# Patient Record
Sex: MALE | Race: WHITE | NOT HISPANIC OR LATINO | Employment: OTHER | URBAN - METROPOLITAN AREA
[De-identification: names, ages, dates, MRNs, and addresses within clinical notes are randomized per-mention and may not be internally consistent; named-entity substitution may affect disease eponyms.]

---

## 2018-02-07 ENCOUNTER — CONVERSION ENCOUNTER (OUTPATIENT)
Dept: UROLOGY | Facility: CLINIC | Age: 68
End: 2018-02-07

## 2018-02-07 ENCOUNTER — OFFICE VISIT CONVERTED (OUTPATIENT)
Dept: UROLOGY | Facility: CLINIC | Age: 68
End: 2018-02-07
Attending: UROLOGY

## 2018-08-07 ENCOUNTER — CONVERSION ENCOUNTER (OUTPATIENT)
Dept: UROLOGY | Facility: CLINIC | Age: 68
End: 2018-08-07

## 2018-08-07 ENCOUNTER — OFFICE VISIT CONVERTED (OUTPATIENT)
Dept: UROLOGY | Facility: CLINIC | Age: 68
End: 2018-08-07
Attending: UROLOGY

## 2019-08-29 ENCOUNTER — OFFICE VISIT CONVERTED (OUTPATIENT)
Dept: UROLOGY | Facility: CLINIC | Age: 69
End: 2019-08-29
Attending: UROLOGY

## 2020-01-06 ENCOUNTER — OFFICE VISIT CONVERTED (OUTPATIENT)
Dept: SURGERY | Facility: CLINIC | Age: 70
End: 2020-01-06
Attending: SURGERY

## 2020-01-22 ENCOUNTER — HOSPITAL ENCOUNTER (OUTPATIENT)
Dept: PREADMISSION TESTING | Facility: HOSPITAL | Age: 70
Discharge: HOME | End: 2020-01-22
Attending: SURGERY

## 2020-01-29 ENCOUNTER — HOSPITAL ENCOUNTER (OUTPATIENT)
Dept: PERIOP | Facility: HOSPITAL | Age: 70
Setting detail: HOSPITAL OUTPATIENT SURGERY
Discharge: HOME | End: 2020-01-29
Attending: SURGERY

## 2020-02-11 ENCOUNTER — OFFICE VISIT CONVERTED (OUTPATIENT)
Dept: SURGERY | Facility: CLINIC | Age: 70
End: 2020-02-11
Attending: SURGERY

## 2020-02-25 ENCOUNTER — OFFICE VISIT CONVERTED (OUTPATIENT)
Dept: SURGERY | Facility: CLINIC | Age: 70
End: 2020-02-25
Attending: SURGERY

## 2020-08-27 ENCOUNTER — OFFICE VISIT CONVERTED (OUTPATIENT)
Dept: UROLOGY | Facility: CLINIC | Age: 70
End: 2020-08-27
Attending: UROLOGY

## 2020-08-27 ENCOUNTER — CONVERSION ENCOUNTER (OUTPATIENT)
Dept: UROLOGY | Facility: CLINIC | Age: 70
End: 2020-08-27

## 2021-02-08 ENCOUNTER — OFFICE VISIT CONVERTED (OUTPATIENT)
Dept: SURGERY | Facility: CLINIC | Age: 71
End: 2021-02-08
Attending: NURSE PRACTITIONER

## 2021-04-12 ENCOUNTER — HOSPITAL ENCOUNTER (OUTPATIENT)
Dept: GASTROENTEROLOGY | Facility: HOSPITAL | Age: 71
Setting detail: HOSPITAL OUTPATIENT SURGERY
Discharge: HOME | End: 2021-04-12
Attending: SURGERY

## 2021-04-26 ENCOUNTER — OFFICE VISIT CONVERTED (OUTPATIENT)
Dept: SURGERY | Facility: CLINIC | Age: 71
End: 2021-04-26
Attending: NURSE PRACTITIONER

## 2021-05-10 NOTE — H&P
History and Physical      Patient Name: Lawrence Olsen   Patient ID: [id number]   Sex: Male   YOB: 1950    Primary Care Provider: Devon Kaplan MD   Referring Provider: Devon Kaplan MD    Visit Date: February 8, 2021    Provider: WILLIAMS Hernandez   Location[place] [facility]   Location Address: [address] [place] [address]  [id number]   Location Phone: [phone]          Chief Complaint  · Requesting colonoscopy  · Age 50 or over  · FH of colon cancer  · Here today for a pre-surgical colon screening visit  · Personal History of Polyps      History Of Present Illness  The patient is a 70 year old /White male presenting to the Surgical Specialist office on a referral from Devon Kaplan MD.   Lawrence Olsen needs to have a screening colonoscopy.   Patient states that they have had a colonoscopy. 5 years ago   Patient currently complains of: no complaints   Patient Does have family history of colon cancer. Father      Patient presents today on referral from Dr. Devon Kaplan for a screening colonoscopy.  Patient denies any abdominal pain, diarrhea, or rectal bleeding.  Admits to family history of colon cancer with his father.  Reports last colonoscopy was 5 years ago. Admits to history of colonic polyps.           Past Medical History  Disease Name Date Onset Notes   Arthritis --  --    Bladder Disorder --  --    BPH (benign prostatic hypertrophy) with urinary obstruction 05/24/2016 --    Cancer --  --    Chest pain --  --    Elevated prostate specific antigen (PSA) 07/21/2017 --    HBP (high blood pressure) --  --    High blood pressure --  --    High cholesterol --  --    Limb Pain --  --    Limb Swelling --  --    Prostate Disorder --  --          Past Surgical History  Procedure Name Date Notes   Cholecstectomy --  --    Colonoscopy --  --    Gallbladder --  --    Hernia --  --          Medication List  Name Date Started Instructions   aspirin 81 mg oral tablet,delayed  "release (DR/EC)  take 1 tablet (81 mg) by oral route once daily   finasteride 5 mg oral tablet  take 1 tablet (5 mg) by oral route once daily   Flomax 0.4 mg oral capsule,extended release 24hr  take 1 capsule (0.4 mg) by oral route once daily 1/2 hour following the same meal each day   ibuprofen 600 mg oral tablet  take 1 tablet (600 mg) by oral route 3 times per day with food   Lotrel 10-20 mg oral capsule  take 1 capsule by oral route once daily         Allergy List  Allergen Name Date Reaction Notes   NO KNOWN DRUG ALLERGIES --  --  --        Allergies Reconciled  Family Medical History  Disease Name Relative/Age Notes   Cancer, Unspecified Father/   --    Heart Attack (MI) Father/   --    Family history of colon cancer Father/50s  Sister/50s   Father/50s; Sister/50s  Father/60s   -Father's Family History Unknown Father/   Father   Bladder calculus Mother/   Mother         Social History  Finding Status Start/Stop Quantity Notes   Alcohol Current some day --/-- occasional --     --  --/-- --  three 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 --  --/-- --  --    Tobacco Former 15/18 --  --          Review of Systems  · Constitutional  o Denies  o : fever, chills  · Eyes  o Denies  o : yellowish discoloration of eyes  · HENT  o Denies  o : difficulty swallowing  · Cardiovascular  o Denies  o : chest pain, chest pain on exertion  · Respiratory  o Denies  o : shortness of breath  · Gastrointestinal  o Denies  o : nausea, vomiting, diarrhea, constipation  · Genitourinary  o Denies  o : abnormal color of urine  · Integument  o Denies  o : rash  · Neurologic  o Denies  o : tingling or numbness  · Musculoskeletal  o Denies  o : joint pain  · Endocrine  o Denies  o : weight gain, weight loss      Vitals  Date Time BP Position Site L\R Cuff Size HR RR TEMP (F) WT  HT  BMI kg/m2 BSA m2 O2 Sat FR L/min FiO2 HC       02/08/2021 10:51 AM       14  199lbs 4oz 5'  5\" 33.16 2.04             Physical " Examination  · Constitutional  o Appearance  o : well developed, well-nourished, patient in no apparent distress  · Head and Face  o Head  o :   § Inspection  § : atraumatic, normocephalic  o Face  o :   § Inspection  § : no facial lesions  · Eyes  o Conjunctivae  o : conjunctivae normal  o Sclerae  o : sclerae white  · Neck  o Inspection/Palpation  o : normal appearance, no masses or tenderness, trachea midline  · Respiratory  o Respiratory Effort  o : breathing unlabored  · Skin and Subcutaneous Tissue  o General Inspection  o : no lesions present, no areas of discoloration, skin turgor normal, texture normal  · Neurologic  o Mental Status Examination  o :   § Orientation  § : grossly oriented to person, place and time  § Attention  § : attention normal, concentration abilities normal  § Fund of Knowledge  § : fund of knowledge within normal limits, patient aware of current events  o Gait and Station  o : normal gait, able to stand without difficulty  · Psychiatric  o Judgement and Insight  o : judgment and insight intact  o Mood and Affect  o : mood normal, affect appropriate              Assessment  · Family History of Colon Cancer     V16.0/Z80.0  · Personal history of colonic polyps     V12.72/Z86.010  · Screening for colon cancer     V76.51/Z12.11      Plan  · Orders  o Consent for Colonoscopy Screening-Possible risk/complications, benefits, and alternatives to surgical or invasive procedure have been explained to patient and/or legal guardian. -Patient has been evaluated and can tolerate anesthesia and/or sedation. Risks, benefits, and alternatives to anesthesia and sedation have been explained to patient and/or legal guardian. () - V12.72/Z86.010, V76.51/Z12.11, V16.0/Z80.0 - 04/12/2021  · Medications  o Medications have been Reconciled  o Transition of Care or Provider Policy  · Instructions  o Surgical Facility: [facility]  o Handouts Provided Pre-Procedure Instructions including date,  time, and location of procedure.   o PLAN: Proceeed with colonoscopy. Patient understands risks/benefits and is willing to proceed.   o ***Surgical Orders***  o RISK AND BENEFITS:  o Given these options, the patient has verbally expressed an understanding of the risks of the surgery and finds these risks acceptable. Will proceed with surgery as soon as possible.  o O.R. PREP: Per protocol   o IV: Per Anesthesia  o Please sign permit for: Colonoscopy with possible biopsies by Dr. Mckay.  o The above History and Physical Examination has been completed within 30 days of admission.  o ***Patient Status***  o Outpatient  o Follow up in the in the office post procedure.  o Electronically Identified Patient Education Materials Provided Electronically            Electronically Signed by: WILLIAMS Hernandez -Author on February 8, 2021 11:15:11 AM

## 2021-05-13 NOTE — PROGRESS NOTES
"   Progress Note      Patient Name: Lawrence Olsen   Patient ID: [id number]   Sex: Male   YOB: 1950    Primary Care Provider: Devon Kaplan MD   Referring Provider: Devon Kaplan MD    Visit Date: August 27, 2020    Provider: Raffy Samuels MD   Location: [facility]   Location Address: [address] [place] [address], [address] [place] [address]  [id number]   Location Phone: [phone]          Chief Complaint  · \"Here for a prostate check\"      History Of Present Illness  The patient is a 70 year old /White male , who presents to routine yearly follow up on bph and history of elevated psa. His most recent psa wnl. Patient underwent bilateral robotic inguinal hernia repair per Dr Mckay in January.          2/7/18 psa 1.76 9-4-19 1.85.  Patient had pain in the left lower quadrant about a week ago which has resolved.  He had some dysuria.  No gross hematuria.  He never had a kidney stone.  Patient was constipated but does not remember exactly how long ago was that.  Gets up 1 time to urinate.  No frequency in the daytime.  No gross hematuria.       Past Medical History  Arthritis; Bladder Disorder; BPH (benign prostatic hypertrophy) with urinary obstruction; Chest pain; Elevated Prostate Specific Antigen (PSA); HBP (high blood pressure); Limb Pain; Limb Swelling; Prostate Disorder         Past Surgical History  Cholecstectomy; Colonoscopy; Gallbladder; Hernia         Medication List  aspirin 81 mg oral tablet,delayed release (DR/EC); finasteride 5 mg oral tablet; Flomax 0.4 mg oral capsule,extended release 24hr; ibuprofen 600 mg oral tablet; Lotrel 10-20 mg oral capsule         Allergy List  NO KNOWN DRUG ALLERGIES         Family Medical History  Cancer, Unspecified; Heart Attack (MI); Family history of colon cancer; -Father's Family History Unknown; Bladder calculus         Social History  Alcohol (Current some day); ; Security; Tobacco (Former)         Review of " "Systems  · Constitutional  o Denies  o : fever, headache, chills  · Eyes  o Denies  o : eye pain, double vision, blurred vision  · HENT  o Denies  o : sinus problems, sore throat, ear infection  · Cardiovascular  o Denies  o : chest pain, high blood pressure, varicosities  · Respiratory  o Denies  o : shortness of breath, wheezing, frequent cough  · Gastrointestinal  o Denies  o : nausea, vomiting, heartburn, indigestion, abdominal pain  · Genitourinary  o Admits  o : painful urination  o Denies  o : urgency, frequency, urinary retention  · Integument  o Denies  o : rash, itching, boils  · Neurologic  o Denies  o : tingling or numbness, tremors, dizzy spells  · Musculoskeletal  o Admits  o : lower back pain on right side  o Denies  o : joint pain, neck pain,   · Endocrine  o Denies  o : cold intolerance, heat intolerance, tired, excessive thirst, sluggish  · Psychiatric  o Admits  o : feels satisfied with life  o Denies  o : severe depression, concerns with hurting themselves  · Heme-Lymph  o Denies  o : swollen glands, blood clotting problems  · Allergic-Immunologic  o Denies  o : sinus allergy symptoms, hay fever      Vitals  Date Time BP Position Site L\R Cuff Size HR RR TEMP (F) WT  HT  BMI kg/m2 BSA m2 O2 Sat        08/27/2020 02:07 /61 Sitting    57 - R   190lbs 0oz 5'  5\" 31.62 1.99           Physical Examination  · Constitutional  o Appearance  o : Well nourished, well developed patient in no acute distress. Ambulating without difficulty.  · Neck  o Thyroid  o : Normal size without tenderness, nodules or masses  · Respiratory  o Respiratory Effort  o : Breathing is unlabored without accessory muscle use  o Inspection of Chest  o : normal appearance, no retractions  o Auscultation of Lungs  o : Normal breath sounds  · Cardiovascular  o Heart  o :   § Auscultation of Heart  § : regular rate and rhythm, no murmurs, gallops or rubs  o Peripheral Vascular System  o : No " abnormalities  · Gastrointestinal  o Abdominal Examination  o : Scaphoid abdomen which is non-tender to palpation with normal tone and without rigidity or guarding. Normal bowel sounds. No masses present.  o Liver and spleen  o : No hepatomegaly present. Liver is non-tender to palpation and spleen is not palpable.  o Hernias  o : No abdominal wall hernias are present.  · Genitourinary  o Bladder  o : no abnormalities  o Penis  o : Normal appearance without lesion, discharge or masses.  o Urethral Meatus  o : no abnormalities  o Scrotum and Scrotal Contents  o :   § Scrotum  § : no abnormalities  § Epididymides  § : no abnormalities  § Testes  § : no abnormalities  o Digital Rectal Examination  o :   § Prostate  § : nontender to palpation, size normal, no nodules present, consistency normal  · Lymphatic  o Neck  o : No lymphadenopathy present  o Groin  o : No lymphadenopathy present  · Skin and Subcutaneous Tissue  o General Inspection  o : No rashes, lesions or areas of discoloration present. Skin turgor is normal.  o General Palpation  o : No abnormalities, masses or tenderness on palpation.  · Neurologic  o Mental Status Examination  o : grossly oriented to person, place and time  o Gait and Station  o : normal gait, able to stand without difficulty  · Psychiatric  o Mood and Affect  o : mood normal, affect appropriate      Figure 1.0: Pain Rating Scale-[place]         Results  · In-Office Procedures  o Lab procedure  § Automated dipstick urinalysis with microscopy (37308)   § Color Ur: Dark yellow   § Clarity Ur: Clear   § Glucose Ur Ql Strip: Negative   § Bilirub Ur Ql Strip: Negative   § Ketones Ur Ql Strip: Negative   § Sp Gr Ur Qn: 1.030   § Hgb Ur Ql Strip: Negative   § pH Ur-LsCnc: 5.5   § Prot Ur Ql Strip: Negative   § Urobilinogen Ur Strip-mCnc: 0.2 E.U./dL   § Nitrite Ur Ql Strip: Negative   § WBC Est Ur Ql Strip: Negative   § RBC UrnS Qn HPF: 0   § WBC UrnS Qn HPF: 0   § Bacteria UrnS Qn HPF: 0    § Crystals UrnS Qn HPF: 0   § Epithelial Cells (non renal): 0 /HPF  § Epithelial Cells (renal): 0       Assessment  · BPH with Urinary Obstruction       Benign prostatic hyperplasia with lower urinary tract symptoms     600.01/N40.1  Other obstructive and reflux uropathy     600.01/N13.8  · BPH (benign prostatic hyperplasia)     600.00/N40.0      Plan  · Medications  o Medications have been Reconciled  o Transition of Care or Provider Policy  · Instructions  o We will continue finasteride 5 mg daily and recheck him in 1 year's time            Electronically Signed by: Raffy Samuels MD -Author on August 27, 2020 02:37:09 PM

## 2021-05-14 VITALS — WEIGHT: 199.25 LBS | RESPIRATION RATE: 14 BRPM | BODY MASS INDEX: 33.2 KG/M2 | HEIGHT: 65 IN

## 2021-05-14 VITALS
HEART RATE: 57 BPM | SYSTOLIC BLOOD PRESSURE: 132 MMHG | HEIGHT: 65 IN | DIASTOLIC BLOOD PRESSURE: 61 MMHG | WEIGHT: 190 LBS | BODY MASS INDEX: 31.65 KG/M2

## 2021-05-14 VITALS — WEIGHT: 198 LBS | HEIGHT: 65 IN | BODY MASS INDEX: 32.99 KG/M2 | RESPIRATION RATE: 14 BRPM

## 2021-05-14 NOTE — PROGRESS NOTES
Progress Note      Patient Name: Lawrence Olsen   Patient ID: [id number]   Sex: Male   YOB: 1950    Primary Care Provider: Devon Kaplan MD   Referring Provider: Devon Kaplan MD    Visit Date: April 26, 2021    Provider: WILLIAMS Hernandez   [facility]   Location Address: [address] [place] [address]  [id number]   Location Phone: [phone]          Chief Complaint  · Follow Up Colonoscopy      History Of Present Illness  Lawrence Olsen is a 71 year old /White male who is here to follow up colonoscopy.      Patient presents today for follow-up visit after undergoing a colonoscopy on 4/12/2021 performed by Dr. José Miguel Mckay.  Patient was with diverticulosis and a tubular adenoma of the sigmoid colon per colonoscopy/pathology. Internal hemorrhoids was also noted during the colonoscopy. Patient denies any postoperative complications.       Past Medical History  Disease Name Date Onset Notes   Arthritis --  --    Bladder disorder --  --    BPH (benign prostatic hypertrophy) with urinary obstruction 05/24/2016 --    Cancer --  --    Chest pain --  --    Elevated prostate specific antigen (PSA) 07/21/2017 --    HBP (high blood pressure) --  --    High blood pressure --  --    High cholesterol --  --    Limb Pain --  --    Limb Swelling --  --    Prostate Disorder --  --          Past Surgical History  Procedure Name Date Notes   Cholecstectomy --  --    Colonoscopy --  --    Gallbladder --  --    Hernia --  --          Medication List  Name Date Started Instructions   aspirin 81 mg oral tablet,delayed release (/EC)  take 1 tablet (81 mg) by oral route once daily   finasteride 5 mg oral tablet  take 1 tablet (5 mg) by oral route once daily   Flomax 0.4 mg oral capsule,extended release 24hr  take 1 capsule (0.4 mg) by oral route once daily 1/2 hour following the same meal each day   ibuprofen 600 mg oral tablet  take 1 tablet (600 mg) by oral route 3 times  "per day with food   Lotrel 10-20 mg oral capsule  take 1 capsule by oral route once daily         Allergy List  Allergen Name Date Reaction Notes   NO KNOWN DRUG ALLERGIES --  --  --        Allergies Reconciled  Family Medical History  Disease Name Relative/Age Notes   Cancer, Unspecified Father/   --    Heart Attack (MI) Father/   --    Family history of colon cancer Father/50s  Sister/50s   Father/50s; Sister/50s  Father/60s   -Father's Family History Unknown Father/   Father   Bladder calculus Mother/   Mother         Social History  Finding Status Start/Stop Quantity Notes   Alcohol Current some day --/-- occasional --     --  --/-- --  three 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 --  --/-- --  --    Tobacco Former 15/18 --  --          Review of Systems  · Constitutional  o Denies  o : chills, fever  · Eyes  o Denies  o : yellowish discoloration of eyes  · HENT  o Denies  o : difficulty swallowing  · Cardiovascular  o Denies  o : chest pain on exertion  · Respiratory  o Denies  o : shortness of breath  · Gastrointestinal  o Denies  o : nausea, vomiting, diarrhea, constipation  · Genitourinary  o Denies  o : abnormal color of urine  · Integument  o Denies  o : rash  · Neurologic  o Denies  o : tingling or numbness  · Musculoskeletal  o Denies  o : joint pain  · Endocrine  o Denies  o : weight gain, weight loss      Vitals  Date Time BP Position Site L\R Cuff Size HR RR TEMP (F) WT  HT  BMI kg/m2 BSA m2 O2 Sat FR L/min FiO2 HC       04/26/2021 01:20 PM       14  198lbs 0oz 5'  5\" 32.95 2.03             Physical Examination  · Constitutional  o Appearance  o : well developed, well-nourished, patient in no apparent distress  · Head and Face  o Head  o :   § Inspection  § : atraumatic, normocephalic  o Face  o :   § Inspection  § : no facial lesions  · Eyes  o Conjunctivae  o : conjunctivae normal  o Sclerae  o : sclerae white  · Neck  o Inspection/Palpation  o : normal appearance, no masses or tenderness, trachea " midline  · Respiratory  o Respiratory Effort  o : breathing unlabored  · Skin and Subcutaneous Tissue  o General Inspection  o : no lesions present, no areas of discoloration, skin turgor normal, texture normal  · Neurologic  o Mental Status Examination  o :   § Orientation  § : grossly oriented to person, place and time  § Attention  § : attention normal, concentration abilities normal  § Fund of Knowledge  § : fund of knowledge within normal limits, patient aware of current events  o Gait and Station  o : normal gait, able to stand without difficulty  · Psychiatric  o Judgement and Insight  o : judgment and insight intact  o Mood and Affect  o : mood normal, affect appropriate              Assessment  · Diverticulosis     562.10/K57.90  · Internal hemorrhoids     455.0/K64.8  · Tubular adenoma     229.9/D36.9  · S/P colonoscopy with polypectomy     V45.89/Z98.890    Problems Reconciled  Plan  · Medications  o Medications have been Reconciled  o Transition of Care or Provider Policy  · Instructions  o Per the AGA guidelines of 2012 patient is to follow up for colonoscopy surveillance  o Rescreen: In 5 years follow-up in the interim as needed.  o Increase fiber to 35 g/day  o Electronically Identified Patient Education Materials Provided Electronically  · Disposition  o EMR dragon/transcription disclaimer: Much of this encounter note is an electronic transcription/translation of spoken language to printed text. Electronic translation of spoken language may permit erroneous, or at times nonsensical words or phrases to be inadvertently trasncribed; although I have reviewed the note for such errors, some may still exist.            Electronically Signed by: WILLIAMS Hernandez -Author on April 26, 2021 02:10:09 PM

## 2021-05-15 VITALS
TEMPERATURE: 97.9 F | BODY MASS INDEX: 30.82 KG/M2 | SYSTOLIC BLOOD PRESSURE: 119 MMHG | HEIGHT: 65 IN | HEART RATE: 67 BPM | WEIGHT: 185 LBS | DIASTOLIC BLOOD PRESSURE: 58 MMHG

## 2021-05-15 VITALS — RESPIRATION RATE: 16 BRPM | WEIGHT: 190 LBS | BODY MASS INDEX: 31.65 KG/M2 | HEIGHT: 65 IN

## 2021-05-15 VITALS — RESPIRATION RATE: 14 BRPM | HEIGHT: 65 IN | WEIGHT: 188 LBS | BODY MASS INDEX: 31.32 KG/M2

## 2021-05-15 VITALS — WEIGHT: 191 LBS | RESPIRATION RATE: 14 BRPM | HEIGHT: 65 IN | BODY MASS INDEX: 31.82 KG/M2

## 2021-05-16 VITALS
HEIGHT: 65 IN | WEIGHT: 208 LBS | HEART RATE: 61 BPM | SYSTOLIC BLOOD PRESSURE: 150 MMHG | DIASTOLIC BLOOD PRESSURE: 81 MMHG | TEMPERATURE: 98.6 F | BODY MASS INDEX: 34.66 KG/M2

## 2021-05-16 VITALS
HEIGHT: 65 IN | WEIGHT: 215 LBS | DIASTOLIC BLOOD PRESSURE: 71 MMHG | HEART RATE: 61 BPM | TEMPERATURE: 98.5 F | SYSTOLIC BLOOD PRESSURE: 125 MMHG | BODY MASS INDEX: 35.82 KG/M2

## 2021-09-07 ENCOUNTER — OFFICE VISIT (OUTPATIENT)
Dept: UROLOGY | Facility: CLINIC | Age: 71
End: 2021-09-07

## 2021-09-07 VITALS
DIASTOLIC BLOOD PRESSURE: 76 MMHG | TEMPERATURE: 97.6 F | WEIGHT: 197 LBS | HEART RATE: 72 BPM | HEIGHT: 65 IN | BODY MASS INDEX: 32.82 KG/M2 | SYSTOLIC BLOOD PRESSURE: 128 MMHG

## 2021-09-07 DIAGNOSIS — N40.2 PROSTATIC NODULE: Primary | ICD-10-CM

## 2021-09-07 DIAGNOSIS — N40.0 BENIGN PROSTATIC HYPERPLASIA WITHOUT LOWER URINARY TRACT SYMPTOMS: ICD-10-CM

## 2021-09-07 LAB
BILIRUB BLD-MCNC: NEGATIVE MG/DL
CLARITY, POC: CLEAR
COLOR UR: YELLOW
GLUCOSE UR STRIP-MCNC: NEGATIVE MG/DL
KETONES UR QL: NEGATIVE
LEUKOCYTE EST, POC: NEGATIVE
NITRITE UR-MCNC: NEGATIVE MG/ML
PH UR: 5.5 [PH] (ref 5–8)
PROT UR STRIP-MCNC: NEGATIVE MG/DL
RBC # UR STRIP: NEGATIVE /UL
SP GR UR: 1.03 (ref 1–1.03)
UROBILINOGEN UR QL: NORMAL

## 2021-09-07 PROCEDURE — 81003 URINALYSIS AUTO W/O SCOPE: CPT | Performed by: UROLOGY

## 2021-09-07 PROCEDURE — 99213 OFFICE O/P EST LOW 20 MIN: CPT | Performed by: UROLOGY

## 2021-09-07 RX ORDER — FINASTERIDE 5 MG/1
TABLET, FILM COATED ORAL
COMMUNITY

## 2021-09-07 RX ORDER — ASPIRIN 81 MG/1
TABLET ORAL
COMMUNITY

## 2021-09-07 RX ORDER — TAMSULOSIN HYDROCHLORIDE 0.4 MG/1
CAPSULE ORAL
COMMUNITY

## 2021-09-07 RX ORDER — AMLODIPINE BESYLATE AND BENAZEPRIL HYDROCHLORIDE 10; 20 MG/1; MG/1
CAPSULE ORAL
COMMUNITY

## 2021-09-07 RX ORDER — IBUPROFEN 800 MG/1
TABLET ORAL
COMMUNITY
Start: 2021-08-17

## 2021-09-07 NOTE — PROGRESS NOTES
"Chief Complaint  Follow-up (1 YEAR)    BPH    Patient on finasteride and Flomax    Subjective  No acute distress        Lawrence Olsen presents to [facility]  History of Present Illness    Patient has nocturia twice and weak stream about 40% of the time but completely empties his urinary bladder.  His PSA was done by his primary care physician and we do not have the result.  Patient is very happy with his neurological symptoms    Objective No acute distress  Vital Signs:   /76 (BP Location: Right arm, Patient Position: Sitting, Cuff Size: Adult)   Pulse 72   Temp 97.6 °F (36.4 °C)   Ht 165.1 cm (65\")   Wt 89.4 kg (197 lb)   BMI 32.78 kg/m²     No Known Allergies   Review of Systems   Constitutional: Negative.    HENT: Negative.    Eyes: Negative.    Respiratory: Negative.    Cardiovascular: Negative.    Gastrointestinal: Negative.    Endocrine: Negative.    Genitourinary: Negative.    Musculoskeletal: Negative.    Skin: Negative.    Allergic/Immunologic: Negative.    Neurological: Negative.    Hematological: Negative.    Psychiatric/Behavioral: Negative.    All other systems reviewed and are negative.       Physical Exam  Constitutional:       Appearance: He is obese. He is not ill-appearing.   HENT:      Head: Normocephalic and atraumatic.      Nose: Nose normal.   Cardiovascular:      Rate and Rhythm: Normal rate and regular rhythm.      Pulses: Normal pulses.      Heart sounds: Normal heart sounds. No murmur heard.     Pulmonary:      Effort: Pulmonary effort is normal. No respiratory distress.      Breath sounds: Normal breath sounds. No rhonchi or rales.   Abdominal:      General: Abdomen is flat. There is no distension.      Palpations: Abdomen is soft. There is mass.      Tenderness: There is no abdominal tenderness. There is no right CVA tenderness, left CVA tenderness or guarding.      Hernia: No hernia is present.   Genitourinary:     Penis: Normal.       Testes: " Normal.      Comments: Prostate gland is about 30 g.  Left lobe is normal right side is little bit firm  Musculoskeletal:         General: No swelling. Normal range of motion.      Cervical back: Normal range of motion and neck supple. No rigidity or tenderness.   Lymphadenopathy:      Cervical: No cervical adenopathy.   Skin:     General: Skin is warm.      Coloration: Skin is not jaundiced.   Neurological:      General: No focal deficit present.      Mental Status: He is alert and oriented to person, place, and time.   Psychiatric:         Mood and Affect: Mood normal.         Behavior: Behavior normal.         Thought Content: Thought content normal.         Judgment: Judgment normal.        Result Review :                 Assessment and Plan    Diagnoses and all orders for this visit:    1. Prostatic nodule (Primary)    2. Benign prostatic hyperplasia without lower urinary tract symptoms  -     POC Urinalysis Dipstick, Automated    Will try to get PSA from his family physicians office.  Patient is on finasteride so we have to be careful about the reading.  I will recheck him in 6 months time    Follow Up   Return in about 6 months (around 3/7/2022).  Patient was given instructions and counseling regarding his condition or for health maintenance advice. Please see specific information pulled into the AVS if appropriate.     Raffy Samuels MD

## 2022-03-14 ENCOUNTER — OFFICE VISIT (OUTPATIENT)
Dept: UROLOGY | Facility: CLINIC | Age: 72
End: 2022-03-14

## 2022-03-14 ENCOUNTER — LAB (OUTPATIENT)
Dept: LAB | Facility: HOSPITAL | Age: 72
End: 2022-03-14

## 2022-03-14 VITALS
BODY MASS INDEX: 32.82 KG/M2 | WEIGHT: 197 LBS | SYSTOLIC BLOOD PRESSURE: 147 MMHG | DIASTOLIC BLOOD PRESSURE: 62 MMHG | HEART RATE: 66 BPM | HEIGHT: 65 IN | TEMPERATURE: 98.4 F

## 2022-03-14 DIAGNOSIS — N36.8 URETHRAL BLEEDING: ICD-10-CM

## 2022-03-14 DIAGNOSIS — N40.3 PROSTATIC NODULE WITH OBSTRUCTION: ICD-10-CM

## 2022-03-14 DIAGNOSIS — N13.8 PROSTATIC NODULE WITH OBSTRUCTION: Primary | ICD-10-CM

## 2022-03-14 DIAGNOSIS — N13.8 PROSTATIC NODULE WITH OBSTRUCTION: ICD-10-CM

## 2022-03-14 DIAGNOSIS — N40.3 PROSTATIC NODULE WITH OBSTRUCTION: Primary | ICD-10-CM

## 2022-03-14 LAB
BILIRUB BLD-MCNC: NEGATIVE MG/DL
CLARITY, POC: CLEAR
COLOR UR: YELLOW
EXPIRATION DATE: NORMAL
GLUCOSE UR STRIP-MCNC: NEGATIVE MG/DL
KETONES UR QL: NEGATIVE
LEUKOCYTE EST, POC: NEGATIVE
Lab: NORMAL
NITRITE UR-MCNC: NEGATIVE MG/ML
PH UR: 5.5 [PH] (ref 5–8)
PROT UR STRIP-MCNC: NEGATIVE MG/DL
PSA SERPL-MCNC: 4.34 NG/ML (ref 0–4)
RBC # UR STRIP: NEGATIVE /UL
SP GR UR: 1.03 (ref 1–1.03)
UROBILINOGEN UR QL: NORMAL

## 2022-03-14 PROCEDURE — 36415 COLL VENOUS BLD VENIPUNCTURE: CPT

## 2022-03-14 PROCEDURE — 81003 URINALYSIS AUTO W/O SCOPE: CPT | Performed by: UROLOGY

## 2022-03-14 PROCEDURE — 99213 OFFICE O/P EST LOW 20 MIN: CPT | Performed by: UROLOGY

## 2022-03-14 PROCEDURE — 84153 ASSAY OF PSA TOTAL: CPT

## 2022-03-14 NOTE — PROGRESS NOTES
"Chief Complaint  Prostate nodule with obstruction    Urethral bleeding    Subjective  Patient continues to have problem with urination        Lawrence Olsen presents to [facility] UROLOGY  History of Present Illness    Patient has incomplete voidings and frequency.  He is not emptying his urinary bladder and about a month ago patient had some urethral bleeding.  He has occasional dysuria and overall AUA score is 18/35.  Patient has been having some low back pains    Objective     No acute distress  Vital Signs:   /62   Pulse 66   Temp 98.4 °F (36.9 °C)   Ht 165.1 cm (65\")   Wt 89.4 kg (197 lb)   BMI 32.78 kg/m²     No Known Allergies   Past medical history:  has a past medical history of Arthritis, Bladder disorder, BPH with urinary obstruction, Cancer (HCC), Chest pain, Elevated PSA (07/21/2017), HBP (high blood pressure), High cholesterol, Limb pain, Limb swelling, and Prostate disorder.   Past surgical history:  has a past surgical history that includes Cholecystectomy; Colonoscopy; Gallbladder surgery; and Hernia repair (Bilateral).  Personal history: family history includes Cancer in his father; Colon cancer (age of onset: 50) in his father and sister; Heart attack in his father; Kidney nephrosis in his mother.  Social history:  reports that he has quit smoking. He has never used smokeless tobacco. He reports current alcohol use. Drug use questions deferred to the physician.    Review of Systems    Patient supposed to have some investigation for kidneys by his PCP    Physical Exam  Constitutional:       General: He is not in acute distress.     Appearance: Normal appearance. He is obese. He is not ill-appearing or toxic-appearing.   HENT:      Head: Normocephalic and atraumatic.      Ears:      Comments: No hearing loss  Eyes:      Pupils: Pupils are equal, round, and reactive to light.   Cardiovascular:      Rate and Rhythm: Normal rate and regular rhythm.      Pulses: Normal " pulses.      Heart sounds: Normal heart sounds. No murmur heard.  Pulmonary:      Effort: Pulmonary effort is normal.      Breath sounds: Normal breath sounds. No rhonchi or rales.   Abdominal:      Palpations: Abdomen is soft.      Tenderness: There is no abdominal tenderness. There is no right CVA tenderness or left CVA tenderness.      Hernia: No hernia is present.   Genitourinary:     Penis: Normal.       Testes: Normal.      Rectum: Normal.      Comments: Scrotum is normal on both sides    Prostate gland is large.  Has a prostate nodule on the right side.  Musculoskeletal:         General: No swelling. Normal range of motion.      Cervical back: Normal range of motion and neck supple. No rigidity or tenderness.      Right lower leg: No edema.      Left lower leg: No edema.   Lymphadenopathy:      Cervical: No cervical adenopathy.   Skin:     General: Skin is warm.      Coloration: Skin is not jaundiced.   Neurological:      General: No focal deficit present.      Mental Status: He is alert and oriented to person, place, and time.      Motor: No weakness.      Gait: Gait normal.   Psychiatric:         Mood and Affect: Mood normal.         Behavior: Behavior normal.         Thought Content: Thought content normal.         Judgment: Judgment normal.        Result Review :                 Assessment and Plan    Diagnoses and all orders for this visit:    1. Prostatic nodule with obstruction (Primary)  -     POC Urinalysis Dipstick, Automated  -     PSA Diagnostic; Future    2. Urethral bleeding  -     POC Urinalysis Dipstick, Automated  -     PSA Diagnostic; Future    Will do PSA on the patient and do cystoscopy next week because of urethral bleeding look at the prostate gland because he is not emptying his bladder completely    Follow Up   Return in about 1 week (around 3/21/2022).  Patient was given instructions and counseling regarding his condition or for health maintenance advice. Please see specific  information pulled into the AVS if appropriate.     Raffy Samuels MD

## 2022-04-05 ENCOUNTER — OFFICE VISIT (OUTPATIENT)
Dept: UROLOGY | Facility: CLINIC | Age: 72
End: 2022-04-05

## 2022-04-05 DIAGNOSIS — R97.20 ELEVATED PROSTATE SPECIFIC ANTIGEN (PSA): ICD-10-CM

## 2022-04-05 DIAGNOSIS — N13.8 BPH WITH OBSTRUCTION/LOWER URINARY TRACT SYMPTOMS: Primary | ICD-10-CM

## 2022-04-05 DIAGNOSIS — N40.0 BENIGN PROSTATIC HYPERPLASIA WITHOUT LOWER URINARY TRACT SYMPTOMS: ICD-10-CM

## 2022-04-05 DIAGNOSIS — R97.20 ELEVATED PROSTATE SPECIFIC ANTIGEN (PSA): Primary | ICD-10-CM

## 2022-04-05 DIAGNOSIS — N40.1 BPH WITH OBSTRUCTION/LOWER URINARY TRACT SYMPTOMS: Primary | ICD-10-CM

## 2022-04-05 DIAGNOSIS — N36.8 URETHRAL BLEEDING: ICD-10-CM

## 2022-04-05 PROCEDURE — 52000 CYSTOURETHROSCOPY: CPT | Performed by: UROLOGY

## 2022-04-05 PROCEDURE — 51798 US URINE CAPACITY MEASURE: CPT | Performed by: UROLOGY

## 2022-04-05 PROCEDURE — 51741 ELECTRO-UROFLOWMETRY FIRST: CPT | Performed by: UROLOGY

## 2022-04-05 NOTE — PROGRESS NOTES
Cystoscopy    Date/Time: 4/5/2022 9:09 AM  Performed by: Raffy Samuels MD  Authorized by: Raffy Samuels MD   Preparation: Patient was prepped and draped in the usual sterile fashion.  Local anesthesia used: yes    Anesthesia:  Local anesthesia used: yes  Local Anesthetic: topical anesthetic  Anesthetic total: 12 mL    Sedation:  Patient sedated: no        Indication.  Urethral bleeding.    PSA is 4.340.  With patient on finasteride is equal to 8.8    Patient was placed in lithotomy position.  Thorough scrubbing her lower abdomen external genitalia was performed with Hibiclens.  18 Olympus flexible cystoscope was inserted into the urethra which was normal.  Prostate gland is large and obstructive.  Patient has large lateral lobes and median lobe.  Bladder is trabeculated.  Both ureteral orifices are normal.  Bladder was looked at carefully and I do not see any bladder tumors.  There was no evidence of vesicocolic fistula.  Flexible cystoscope was removed and I can see some petechial hemorrhagic areas in the urethra.  I do not see any urethral tumors.  Patient tolerated the procedure very well.    Uroflow.    Q-Merrick.  6.7 mL/s    Average flow.  2.5 mL/s  Voided volume.  270 mL  Post void residual with bladder scan 122 mL.    Patient is having some problem with back and going to see neurosurgeon this week.  We are going to do MRI of his prostate and I have alerted him that he might go into urinary retention after the back surgery for a while.  We will recheck him after MRI of prostate and if it is negative he probably needs to have a TUR prostate.

## 2022-04-06 ENCOUNTER — OFFICE VISIT (OUTPATIENT)
Dept: NEUROSURGERY | Facility: CLINIC | Age: 72
End: 2022-04-06

## 2022-04-06 VITALS
SYSTOLIC BLOOD PRESSURE: 118 MMHG | BODY MASS INDEX: 31.16 KG/M2 | WEIGHT: 187 LBS | DIASTOLIC BLOOD PRESSURE: 63 MMHG | HEART RATE: 53 BPM | HEIGHT: 65 IN

## 2022-04-06 DIAGNOSIS — M51.36 DEGENERATION OF L4-L5 INTERVERTEBRAL DISC: ICD-10-CM

## 2022-04-06 DIAGNOSIS — M47.816 FACET ARTHROPATHY, LUMBAR: Primary | ICD-10-CM

## 2022-04-06 DIAGNOSIS — G89.29 CHRONIC MIDLINE LOW BACK PAIN WITHOUT SCIATICA: ICD-10-CM

## 2022-04-06 DIAGNOSIS — M71.30 SYNOVIAL CYST: ICD-10-CM

## 2022-04-06 DIAGNOSIS — M54.50 CHRONIC MIDLINE LOW BACK PAIN WITHOUT SCIATICA: ICD-10-CM

## 2022-04-06 PROCEDURE — 99204 OFFICE O/P NEW MOD 45 MIN: CPT | Performed by: PHYSICIAN ASSISTANT

## 2022-04-06 RX ORDER — SODIUM FLUORIDE 5 MG/G
GEL, DENTIFRICE DENTAL
COMMUNITY

## 2022-04-06 NOTE — PROGRESS NOTES
"Chief Complaint  Back Pain    Subjective          Lawrence Olsen who is a 72 y.o. year old male who presents to [facility] for Evaluation of the Spine.     The patient complains of pain located in the Lumbar Spine.  Patients states the pain has been present for 8 months.  The pain came on gradually.  The pain scaled level is 8.  The pain does radiate. Dermatomes are located bilaterally Lumbar at: into the bilateral hips.  The pain is Intermittent and described as aching.  The pain is worse at nighttime. Patient states being up and walking, taking Motrin makes the pain better.  Patient states Prolonged Sitting and laying down makes the pain worse.    Associated Symptoms Include: Denies numbness, tingling, weakness or loss of bowel or bladder control.  Conservative Interventions Include: NSAIDs that were somewhat effective.    Was this the result of an injury or accident?: No    History of Previous Spinal Surgery?: No     reports that he has quit smoking. He has never used smokeless tobacco.    Review of Systems     Objective   Vital Signs:   /63   Pulse 53   Ht 165.1 cm (65\")   Wt 84.8 kg (187 lb)   BMI 31.12 kg/m²       Physical Exam  Constitutional:       Appearance: Normal appearance.   Pulmonary:      Effort: Pulmonary effort is normal.   Musculoskeletal:         General: No tenderness.      Comments: SLR negative bilaterally   Neurological:      General: No focal deficit present.      Mental Status: He is alert and oriented to person, place, and time.      Sensory: No sensory deficit.      Motor: No weakness.      Deep Tendon Reflexes: Reflexes normal.   Psychiatric:         Mood and Affect: Mood normal.         Behavior: Behavior normal.        Neurologic Exam     Mental Status   Oriented to person, place, and time.        Result Review  {Labs  Result Review  Imaging  Med Tab  Media  Procedures :23}   I have personally viewed the MRI of lumbar spine " without contrast from 3/16/2022 which shows multilevel degenerative disc disease and facet arthritis, worst at L4-L5 where there is moderate disc protrusion and a left facet synovial cyst contributing to severe central canal stenosis.       Assessment and Plan    Diagnoses and all orders for this visit:    1. Facet arthropathy, lumbar (Primary)  -     Ambulatory Referral to Pain Management    2. Degeneration of L4-L5 intervertebral disc    3. Synovial cyst    4. Chronic midline low back pain without sciatica  -     Ambulatory Referral to Pain Management    His pain is mostly in the back. I don't see that surgery is likely to help his back pain.    He does have severe stenosis at L4-L5 with a left synovial cyst at that level. If he develops pain down the legs, he may benefit from surgery for this.    He could consider MBBs/RFA for the lumbar spine for axial back pain. I will refer him to [facility]PS to consult for this.    The patient was counseled on basic recommendations for the reduction and prevention of back, neck, or spine pain in association with spinal disorders, including: cessation/avoidance of nicotine use, maintenance of a healthy BMI and weight, focusing on building/maintaining core strength through core exercise, and avoidance of activities which worsen the pain. Surgery for patients with multilevel degenerative disc disease is not typically successful, with the risks outweighing the benefit. The patient will monitor for changes in symptoms and notify our clinic of these changes as needed.    He will follow-up here PRN for failure to improve or worsening symptoms.    Follow Up   Return if symptoms worsen or fail to improve.  Patient was given instructions and counseling regarding his condition or for health maintenance advice. Please see specific information pulled into the AVS if appropriate.

## 2022-05-24 ENCOUNTER — APPOINTMENT (OUTPATIENT)
Dept: OTHER | Facility: HOSPITAL | Age: 72
End: 2022-05-24

## 2022-05-24 ENCOUNTER — HOSPITAL ENCOUNTER (OUTPATIENT)
Dept: MRI IMAGING | Facility: HOSPITAL | Age: 72
Discharge: HOME | End: 2022-05-24

## 2022-05-24 DIAGNOSIS — R97.20 ELEVATED PROSTATE SPECIFIC ANTIGEN (PSA): ICD-10-CM

## 2022-05-24 DIAGNOSIS — Z09 FOLLOW-UP EXAM: ICD-10-CM

## 2022-05-24 LAB — CREAT BLDA-MCNC: 0.9 MG/DL (ref 0.6–1.3)

## 2022-05-24 PROCEDURE — 72197 MRI PELVIS W/O & W/DYE: CPT

## 2022-05-24 PROCEDURE — 82565 ASSAY OF CREATININE: CPT

## 2022-05-24 PROCEDURE — 0 GADOBENATE DIMEGLUMINE 529 MG/ML SOLUTION: Performed by: UROLOGY

## 2022-05-24 PROCEDURE — A9577 INJ MULTIHANCE: HCPCS | Performed by: UROLOGY

## 2022-05-24 RX ADMIN — GADOBENATE DIMEGLUMINE 17 ML: 529 INJECTION, SOLUTION INTRAVENOUS at 16:27

## 2022-05-25 ENCOUNTER — TRANSCRIBE ORDERS (OUTPATIENT)
Dept: ADMINISTRATIVE | Facility: HOSPITAL | Age: 72
End: 2022-05-25

## 2022-05-25 DIAGNOSIS — R97.20 ELEVATED PSA: Primary | ICD-10-CM

## 2022-06-02 ENCOUNTER — APPOINTMENT (OUTPATIENT)
Dept: MRI IMAGING | Facility: HOSPITAL | Age: 72
End: 2022-06-02

## 2022-06-13 ENCOUNTER — HOSPITAL ENCOUNTER (OUTPATIENT)
Dept: MRI IMAGING | Facility: HOSPITAL | Age: 72
Discharge: HOME | End: 2022-06-13
Admitting: UROLOGY

## 2022-06-13 DIAGNOSIS — R97.20 ELEVATED PSA: ICD-10-CM

## 2022-06-13 LAB — CREAT BLDA-MCNC: 0.9 MG/DL (ref 0.6–1.3)

## 2022-06-13 PROCEDURE — 0 GADOBENATE DIMEGLUMINE 529 MG/ML SOLUTION: Performed by: UROLOGY

## 2022-06-13 PROCEDURE — 82565 ASSAY OF CREATININE: CPT

## 2022-06-13 PROCEDURE — A9577 INJ MULTIHANCE: HCPCS | Performed by: UROLOGY

## 2022-06-13 RX ADMIN — GADOBENATE DIMEGLUMINE 17 ML: 529 INJECTION, SOLUTION INTRAVENOUS at 17:29

## 2023-12-20 ENCOUNTER — OFFICE VISIT (OUTPATIENT)
Dept: ORTHOPEDIC SURGERY | Facility: CLINIC | Age: 73
End: 2023-12-20
Payer: MEDICARE

## 2023-12-20 VITALS
HEART RATE: 63 BPM | BODY MASS INDEX: 31.16 KG/M2 | WEIGHT: 187 LBS | OXYGEN SATURATION: 95 % | SYSTOLIC BLOOD PRESSURE: 133 MMHG | HEIGHT: 65 IN | DIASTOLIC BLOOD PRESSURE: 79 MMHG

## 2023-12-20 DIAGNOSIS — M25.562 LEFT KNEE PAIN, UNSPECIFIED CHRONICITY: Primary | ICD-10-CM

## 2023-12-20 DIAGNOSIS — M17.12 OSTEOARTHRITIS OF LEFT KNEE, UNSPECIFIED OSTEOARTHRITIS TYPE: ICD-10-CM

## 2023-12-20 RX ORDER — TRIAMCINOLONE ACETONIDE 40 MG/ML
40 INJECTION, SUSPENSION INTRA-ARTICULAR; INTRAMUSCULAR
Status: COMPLETED | OUTPATIENT
Start: 2023-12-20 | End: 2023-12-20

## 2023-12-20 RX ORDER — LIDOCAINE HYDROCHLORIDE 10 MG/ML
5 INJECTION, SOLUTION INFILTRATION; PERINEURAL
Status: COMPLETED | OUTPATIENT
Start: 2023-12-20 | End: 2023-12-20

## 2023-12-20 RX ADMIN — TRIAMCINOLONE ACETONIDE 40 MG: 40 INJECTION, SUSPENSION INTRA-ARTICULAR; INTRAMUSCULAR at 13:16

## 2023-12-20 RX ADMIN — LIDOCAINE HYDROCHLORIDE 5 ML: 10 INJECTION, SOLUTION INFILTRATION; PERINEURAL at 13:16

## 2023-12-20 NOTE — PROGRESS NOTES
"Chief Complaint  Initial Evaluation of the Left Knee     Subjective      Lawrence Olsen presents to [facility] for initial evaluation of the left knee. He has had pain for years.  He notes increase pain the last few months.  He had X rays at MRI of the left knee.  He has decrease ROM and difficulty with prolonged standing, ambulation and stairs. He has had no recent injury or fall.       No Known Allergies     Social History     Socioeconomic History    Marital status:     Number of children: 3   Tobacco Use    Smoking status: Former     Packs/day: 0.25     Years: 1.00     Additional pack years: 0.00     Total pack years: 0.25     Types: Cigarettes     Quit date: 10/10/1971     Years since quittin.2    Smokeless tobacco: Never    Tobacco comments:     Teen   Vaping Use    Vaping Use: Never used   Substance and Sexual Activity    Alcohol use: Yes     Alcohol/week: 2.0 standard drinks of alcohol     Types: 2 Cans of beer per week     Comment: Occasional    Drug use: Never    Sexual activity: Yes     Partners: Female     Birth control/protection: Condom     Comment: Spouse only        I reviewed the patient's chief complaint, history of present illness, review of systems, past medical history, surgical history, family history, social history, medications, and allergy list.     Review of Systems     Constitutional: Denies fevers, chills, weight loss  Cardiovascular: Denies chest pain, shortness of breath  Skin: Denies rashes, acute skin changes  Neurologic: Denies headache, loss of consciousness        Vital Signs:   /79   Pulse 63   Ht 165.1 cm (65\")   Wt 84.8 kg (187 lb)   SpO2 95%   BMI 31.12 kg/m²          Physical Exam  General: Alert. No acute distress    Ortho Exam        LEFT KNEE Flexion 115. Extension 0. Stable to varus/valgus stress. Stable to anterior/posterior drawer. Neurovascularly intact. Positive Marlene. Negative Lachman. Positive EHL, FHL, HS " and TA. Sensation intact to light touch all 5 nerves of the foot. Ambulates with Antalgic gait. Patella is well tracking. Calf supple, non-tender. Positive tenderness to the medial joint line. Positive tenderness to the lateral joint line. Positive Crepitus. Good strength to hamstrings, quadriceps, dorsiflexors, and plantar flexors.  Knee Extensor Mechanism intact       Left knee: L knee  Date/Time: 12/20/2023 1:16 PM  Consent given by: patient  Site marked: site marked  Timeout: Immediately prior to procedure a time out was called to verify the correct patient, procedure, equipment, support staff and site/side marked as required   Supporting Documentation  Indications: pain   Procedure Details  Location: knee - L knee  Needle size: 22 G  Medications administered: 5 mL lidocaine 1 %; 40 mg triamcinolone acetonide 40 MG/ML  Patient tolerance: patient tolerated the procedure well with no immediate complications              Imaging Results (Most Recent)       None             Result Review :        12/8/23 [facility]   PROCEDURE: MRI KNEE WO CONTRAST 99419  REASON FOR EXAM: M17.9 OSTEOARTHRITIS OF LEFT KNEE left knee pain with instability for over 1 year.  COMPARISON: Left knee series 11/17/2023  TECHNIQUE: Multiplanar multi-echo imaging of the left knee.  FINDINGS: Tricompartment osteoarthritis with medial compartment predominance. Prominent medial femoral tibial osteophytes.  Trace amount of edema medial tibial plateau. Extensive full-thickness or near full-thickness cartilage loss medial femoral condyle  and medial tibial plateau. Mild chondromalacia inferior median ridge of the patella. Mild to moderate grade chondromalacia lateral  femoral trochlea.  Diffuse myxoid degeneration medial meniscus with a full-thickness or near full-thickness radial tear at the root attachment  measuring up to 8 mm in width. Medial extrusion of the meniscus. Lateral meniscus appears intact.  Diffuse mucoid degeneration of the ACL  with suspected pericruciate ganglion cyst near the femoral attachment the ACL  measuring up to 1.5 cm. The posterior cruciate ligament appears intact. Moderate thickening of the proximal MCL may represent  the sequela of prior sprain. The lateral collateral ligament complex appears normal. Extensor mechanism unremarkable. Mild  anterior knee soft tissue edema.  IMPRESSION: Tricompartment osteoarthritis with medial compartment predominance.  Diffuse myxoid degeneration medial meniscus with a full-thickness or near full-thickness radial tear of the root attachment  measuring up to 8 mm. Mild medial extrusion of the meniscus.  Mucoid degeneration the ACL with proximal pericruciate ganglion cyst in the posterior intercondylar notch.     Assessment and Plan     Diagnoses and all orders for this visit:    1. Left knee pain, unspecified chronicity (Primary)  -     Left knee: L knee    2. Osteoarthritis of left knee, unspecified osteoarthritis type  -     Left knee: L knee        Discussed the treatment plan with the patient.    Discussed the risks and benefits of conservative measures. The patient expressed understanding and wished to proceed with a left knee steroid injection. He tolerated the injection well.     Will obtain X-Rays of the left knee at the next visit as I want a standing X ray and the last X rays were at [facility].      Discussed surgery. and Call or return if worsening symptoms.    Follow Up     PRN      Patient was given instructions and counseling regarding his condition or for health maintenance advice. Please see specific information pulled into the AVS if appropriate.     Scribed for Karl Cortes MD by Lara Mattson MA.  12/20/23   13:05 EST      I have personally performed the services described in this document as scribed by the above individual and it is both accurate and complete. Karl Cortes MD 12/20/23

## 2023-12-21 ENCOUNTER — PATIENT ROUNDING (BHMG ONLY) (OUTPATIENT)
Dept: ORTHOPEDIC SURGERY | Facility: CLINIC | Age: 73
End: 2023-12-21
Payer: MEDICARE

## 2023-12-21 NOTE — PROGRESS NOTES
A [employer] message has been sent to the patient for PATIENT ROUNDING with [facility].

## 2024-01-09 ENCOUNTER — OFFICE VISIT (OUTPATIENT)
Dept: UROLOGY | Facility: CLINIC | Age: 74
End: 2024-01-09
Payer: MEDICARE

## 2024-01-09 VITALS
SYSTOLIC BLOOD PRESSURE: 123 MMHG | BODY MASS INDEX: 35.45 KG/M2 | WEIGHT: 212.8 LBS | HEIGHT: 65 IN | DIASTOLIC BLOOD PRESSURE: 70 MMHG | HEART RATE: 74 BPM

## 2024-01-09 DIAGNOSIS — N13.8 BPH WITH OBSTRUCTION/LOWER URINARY TRACT SYMPTOMS: Primary | ICD-10-CM

## 2024-01-09 DIAGNOSIS — N40.1 BPH WITH OBSTRUCTION/LOWER URINARY TRACT SYMPTOMS: Primary | ICD-10-CM

## 2024-01-09 LAB
BILIRUB BLD-MCNC: NEGATIVE MG/DL
CLARITY, POC: CLEAR
COLOR UR: YELLOW
EXPIRATION DATE: NORMAL
GLUCOSE UR STRIP-MCNC: NEGATIVE MG/DL
KETONES UR QL: NEGATIVE
LEUKOCYTE EST, POC: NEGATIVE
Lab: NORMAL
NITRITE UR-MCNC: NEGATIVE MG/ML
PH UR: 5.5 [PH] (ref 5–8)
PROT UR STRIP-MCNC: NEGATIVE MG/DL
RBC # UR STRIP: NEGATIVE /UL
SP GR UR: 1.03 (ref 1–1.03)
UROBILINOGEN UR QL: NORMAL

## 2024-01-09 RX ORDER — FINASTERIDE 5 MG/1
TABLET, FILM COATED ORAL
Qty: 90 TABLET | Refills: 3 | Status: SHIPPED | OUTPATIENT
Start: 2024-01-09 | End: 2025-01-08

## 2024-01-09 RX ORDER — TAMSULOSIN HYDROCHLORIDE 0.4 MG/1
CAPSULE ORAL
Qty: 90 CAPSULE | Refills: 3 | Status: SHIPPED | OUTPATIENT
Start: 2024-01-09 | End: 2025-01-08

## 2024-01-09 NOTE — PROGRESS NOTES
"Chief Complaint  Benign Prostatic Hypertrophy (Follow/up)    Subjective no acute distress        Lawrence Olsen presents to [facility] UROLOG[facility]  History of Present Illness    73-year-old white male continues to have voiding difficulties.  MRI of the prostate done about 1 year ago did not reveal any suspicious lesions.  He had no bladder lesions in the prostate.  Patient continues to have difficulty with emptying his urinary bladder and urgency with minor incontinence.  Patient advised to have TUR prostate but he does not want to have it done till the fall of this year.  Patient has to have some operations before his prostate can be taken care of.  Patient has no dysuria or gross hematuria.  No family history of prostate cancer.    Objective no acute distress  Vital Signs:   /70 (BP Location: Right arm, Patient Position: Sitting, Cuff Size: Adult)   Pulse 74   Ht 165.1 cm (65\")   Wt 96.5 kg (212 lb 12.8 oz)   BMI 35.41 kg/m²     No Known Allergies   Past medical history:  has a past medical history of Ankle sprain (Unknown), Arthritis, Arthritis of back (2020), Bladder disorder, BPH with urinary obstruction, Cancer, Chest pain, Elevated PSA (07/21/2017), HBP (high blood pressure), High cholesterol, Hip arthrosis (2017), Knee swelling (2020), Limb pain, Limb swelling, Low back strain (?), Prostate disorder, and Tear of meniscus of knee (12/23).   Past surgical history:  has a past surgical history that includes Cholecystectomy; Colonoscopy; Gallbladder surgery; and Hernia repair (Bilateral).  Personal history: family history includes Cancer in his brother, father, and sister; Colon cancer (age of onset: 50) in his father and sister; Heart attack in his father; Kidney nephrosis in his mother.  Social history:  reports that he quit smoking about 52 years ago. His smoking use included cigarettes. He has a 0.25 pack-year smoking history. He has never used smokeless tobacco. He reports current " alcohol use of about 2.0 standard drinks of alcohol per week. He reports that he does not use drugs.    Review of Systems    No change from before    Physical Exam  Constitutional:       General: He is not in acute distress.     Appearance: Normal appearance. He is obese. He is not ill-appearing or toxic-appearing.   HENT:      Head: Normocephalic and atraumatic.      Ears:      Comments: No loss of hearing  Pulmonary:      Effort: Pulmonary effort is normal. No respiratory distress.   Abdominal:      Palpations: There is no mass.      Tenderness: There is no abdominal tenderness.      Hernia: A hernia is present.      Comments: Patient has divarication of recti and small umbilical hernia   Genitourinary:     Penis: Normal.       Testes: Normal.      Comments: MICKY.  Prostate gland is just about 45 g and benign.  Do not feel any hard areas in the prostate.  Musculoskeletal:         General: No swelling. Normal range of motion.   Skin:     General: Skin is warm.      Coloration: Skin is not jaundiced.   Neurological:      Mental Status: He is alert.        Result Review :                 Assessment and Plan    Diagnoses and all orders for this visit:    1. BPH with obstruction/lower urinary tract symptoms (Primary)  -     POC Urinalysis Dipstick, Automated  -     finasteride (PROSCAR) 5 MG tablet; 1 tablet daily  Dispense: 90 tablet; Refill: 3  -     tamsulosin (FLOMAX) 0.4 MG capsule 24 hr capsule; 1 tablet PCHS daily  Dispense: 90 capsule; Refill: 3    Will continue finasteride 5 mg daily and tamsulosin 0.4 mg PCHS.  See me in 6 months and will be can have aquablation of prostate at that time     Brief Urine Lab Results  (Last result in the past 365 days)        Color   Clarity   Blood   Leuk Est   Nitrite   Protein   CREAT   Urine HCG        01/09/24 1420 Yellow   Clear   Negative   Negative   Negative   Negative                    Follow Up   No follow-ups on file.  Patient was given instructions and counseling  regarding his condition or for health maintenance advice. Please see specific information pulled into the AVS if appropriate.     Raffy Samuels MD

## 2024-01-29 ENCOUNTER — OFFICE VISIT (OUTPATIENT)
Dept: ORTHOPEDIC SURGERY | Facility: CLINIC | Age: 74
End: 2024-01-29
Payer: MEDICARE

## 2024-01-29 VITALS — HEART RATE: 59 BPM | BODY MASS INDEX: 35.32 KG/M2 | HEIGHT: 65 IN | WEIGHT: 212 LBS | OXYGEN SATURATION: 97 %

## 2024-01-29 DIAGNOSIS — M25.561 CHRONIC PAIN OF RIGHT KNEE: ICD-10-CM

## 2024-01-29 DIAGNOSIS — G89.29 CHRONIC PAIN OF RIGHT KNEE: ICD-10-CM

## 2024-01-29 DIAGNOSIS — M25.562 LEFT KNEE PAIN, UNSPECIFIED CHRONICITY: Primary | ICD-10-CM

## 2024-01-29 RX ADMIN — LIDOCAINE HYDROCHLORIDE 5 ML: 10 INJECTION, SOLUTION INFILTRATION; PERINEURAL at 14:42

## 2024-01-29 RX ADMIN — TRIAMCINOLONE ACETONIDE 40 MG: 40 INJECTION, SUSPENSION INTRA-ARTICULAR; INTRAMUSCULAR at 14:42

## 2024-01-29 NOTE — PROGRESS NOTES
"Chief Complaint  Follow-up and Pain of the Left Knee and Initial Evaluation of the Right Knee    Subjective      Lawrence Olsen presents to [facility] for follow-up of left knee and initial evaluation of the right knee.  Patient had a steroid injection into the left knee on 12/20/2023 and reports that it is now \"doing really good.\"  Reports that he is having a great deal of right knee pain and has never had this evaluated before.  He has had no treatment for the right knee.  Denies any new injury.  He has difficulty with prolonged standing, ambulation and stairs.    Objective   No Known Allergies    Vital Signs:   Pulse 59   Ht 165.1 cm (65\")   Wt 96.2 kg (212 lb)   SpO2 97%   BMI 35.28 kg/m²       Physical Exam    Constitutional: Awake, alert. Well nourished appearance.    Integumentary: Warm, dry, intact. No obvious rashes.    HENT: Atraumatic, normocephalic.   Respiratory: Non labored respirations .   Cardiovascular: Intact peripheral pulses.    Psychiatric: Normal mood and affect. A&O X3    Ortho Exam  Bilateral knees: Range of motion 0 to 125 degrees.  Stable to varus and valgus stress.  Stable to anterior posterior drawer test.  Tenderness palpation in the medial lateral joint line.  Negative Lachman.  Negative Marlene's.  Neurovascular intact.  Sensation is intact.  Positive crepitus.    Imaging Results (Most Recent)       Procedure Component Value Units Date/Time    XR Knee 3 View Right [[id number]] Resulted: 01/29/24 1503     Updated: 01/29/24 1504    Narrative:      X-Ray Report:  Study: X-rays ordered, taken in the office, and reviewed today.   Site: Right knee xray  Indication: Right knee pain  View: AP/Lateral, Standing, and [place] view(s)  Findings: Advanced tricompartmental osteoarthritis noted with most   pronounced joint space narrowing in the medial compartment.  Multiple   large osteophytes noted.  Prior studies available for comparison: no     XR Knee 3 View " Left [[id number]] Resulted: 24 1503     Updated: 24 1503    Narrative:      X-Ray Report:  Study: X-rays ordered, taken in the office, and reviewed today.   Site: Left knee xray  Indication: Left knee pain  View: AP/Lateral, Standing, and [place] view(s)  Findings: Advanced tricompartmental osteoarthritic changes noted with   multiple areas of osteophytes.  Pronounced joint space narrowing in the   medial compartment.  Prior studies available for comparison: yes                Large Joint RIGHT KNEE: R knee  Date/Time: 2024 2:42 PM  Consent given by: patient  Site marked: site marked  Timeout: Immediately prior to procedure a time out was called to verify the correct patient, procedure, equipment, support staff and site/side marked as required   Supporting Documentation  Indications: pain   Procedure Details  Location: knee - R knee  Preparation: Patient was prepped and draped in the usual sterile fashion  Needle gauge: 21G.  Medications administered: 5 mL lidocaine 1 %; 40 mg triamcinolone acetonide 40 MG/ML  Patient tolerance: patient tolerated the procedure well with no immediate complications              Assessment and Plan   Problem List Items Addressed This Visit    None  Visit Diagnoses       Left knee pain, unspecified chronicity    -  Primary    Relevant Orders    XR Knee 3 View Left (Completed)    Chronic pain of right knee        Relevant Orders    XR Knee 3 View Right (Completed)    Large Joint RIGHT KNEE: R knee            Follow Up   Return in about 6 weeks (around 3/11/2024).    Patient is a non-smoker, did not discuss options for smoking cessation.     Social History     Socioeconomic History    Marital status:     Number of children: 3   Tobacco Use    Smoking status: Former     Packs/day: 0.25     Years: 1.00     Additional pack years: 0.00     Total pack years: 0.25     Types: Cigarettes     Quit date: 10/10/1971     Years since quittin.3    Smokeless tobacco: Never     Tobacco comments:     Teen   Vaping Use    Vaping Use: Never used   Substance and Sexual Activity    Alcohol use: Yes     Alcohol/week: 2.0 standard drinks of alcohol     Types: 2 Cans of beer per week     Comment: Occasional    Drug use: Never    Sexual activity: Yes     Partners: Female     Birth control/protection: Condom     Comment: Spouse only       Patient Instructions   X-rays taken of bilateral knees and reviewed with patient.    Discussed treatment plan of care with patient.  Advised on 3 months duration between steroid injections.    For the right knee, we discussed conservative measures to include injections, exercises and anti-inflammatories.  Patient would like to try a right knee steroid injection today in office.  This was given without difficulty.  Patient tolerated procedure well.  Discussed with patient that if he is diabetic he is to closely monitor his blood glucose level over the next 24 to 48 hours.    Follow-up in 6 weeks to evaluate effectiveness of injection for the right knee and the potential for a subsequent left knee injection.  Call for questions, concerns or worsening symptoms.  Patient was given instructions and counseling regarding his condition or for health maintenance advice. Please see specific information pulled into the AVS if appropriate.

## 2024-02-04 RX ORDER — LIDOCAINE HYDROCHLORIDE 10 MG/ML
5 INJECTION, SOLUTION INFILTRATION; PERINEURAL
Status: COMPLETED | OUTPATIENT
Start: 2024-01-29 | End: 2024-01-29

## 2024-02-04 RX ORDER — TRIAMCINOLONE ACETONIDE 40 MG/ML
40 INJECTION, SUSPENSION INTRA-ARTICULAR; INTRAMUSCULAR
Status: COMPLETED | OUTPATIENT
Start: 2024-01-29 | End: 2024-01-29

## 2024-02-05 NOTE — PATIENT INSTRUCTIONS
X-rays taken of bilateral knees and reviewed with patient.    Discussed treatment plan of care with patient.  Advised on 3 months duration between steroid injections.    For the right knee, we discussed conservative measures to include injections, exercises and anti-inflammatories.  Patient would like to try a right knee steroid injection today in office.  This was given without difficulty.  Patient tolerated procedure well.  Discussed with patient that if he is diabetic he is to closely monitor his blood glucose level over the next 24 to 48 hours.    Follow-up in 6 weeks to evaluate effectiveness of injection for the right knee and the potential for a subsequent left knee injection.  Call for questions, concerns or worsening symptoms.

## 2024-03-11 ENCOUNTER — OFFICE VISIT (OUTPATIENT)
Dept: ORTHOPEDIC SURGERY | Facility: CLINIC | Age: 74
End: 2024-03-11
Payer: MEDICARE

## 2024-03-11 VITALS
WEIGHT: 212 LBS | HEIGHT: 65 IN | SYSTOLIC BLOOD PRESSURE: 147 MMHG | DIASTOLIC BLOOD PRESSURE: 84 MMHG | OXYGEN SATURATION: 95 % | HEART RATE: 66 BPM | BODY MASS INDEX: 35.32 KG/M2

## 2024-03-11 DIAGNOSIS — G89.29 CHRONIC PAIN OF BOTH KNEES: Primary | ICD-10-CM

## 2024-03-11 DIAGNOSIS — M25.561 CHRONIC PAIN OF BOTH KNEES: Primary | ICD-10-CM

## 2024-03-11 DIAGNOSIS — M25.562 CHRONIC PAIN OF BOTH KNEES: Primary | ICD-10-CM

## 2024-03-11 PROCEDURE — 1160F RVW MEDS BY RX/DR IN RCRD: CPT

## 2024-03-11 PROCEDURE — 1159F MED LIST DOCD IN RCRD: CPT

## 2024-03-11 PROCEDURE — 20610 DRAIN/INJ JOINT/BURSA W/O US: CPT

## 2024-03-11 NOTE — PROGRESS NOTES
"Chief Complaint  Follow-up of the Left Knee and Follow-up of the Right Knee    Subjective      Lawrence Olsen presents to [facility] for follow-up of bilateral knee pain.  Patient has previously had a steroid injection of the left knee on 12/20/2023 and had an injection of the steroid into the right knee on 1/29/2024.  Patient has advanced arthritis in both knees.  Reports today that his right knee is doing good, he is beginning to have increased pain with the left knee.  Reports that he is currently battling an aortic aneurysm and is scheduled to have scans and follow-up appointments.  He is unsure with the plan of care he is at this current time or if it requires surgery.    Objective   No Known Allergies    Vital Signs:   /84   Pulse 66   Ht 165.1 cm (65\")   Wt 96.2 kg (212 lb)   SpO2 95%   BMI 35.28 kg/m²     Please follow-up with PCP regarding blood pressure.    Physical Exam    Constitutional: Awake, alert. Well nourished appearance.    Integumentary: Warm, dry, intact. No obvious rashes.    HENT: Atraumatic, normocephalic.   Respiratory: Non labored respirations .   Cardiovascular: Intact peripheral pulses.    Psychiatric: Normal mood and affect. A&O X3    Ortho Exam  Bilateral knees: Range of motion 0 to 125 degrees.  Stable to varus and valgus stress.  Stable to anterior posterior drawer test.  Tender to palpation the medial and lateral joint line.  Negative Lachman's.  Negative Marlene's.  Neurovascular intact.  Sensation is intact.  Positive crepitus.    Imaging Results (Most Recent)       None             Large Joint: R knee  Date/Time: 3/11/2024 3:35 PM  Consent given by: patient  Site marked: site marked  Timeout: Immediately prior to procedure a time out was called to verify the correct patient, procedure, equipment, support staff and site/side marked as required   Supporting Documentation  Indications: pain   Procedure Details  Location: knee - R " knee  Preparation: Patient was prepped and draped in the usual sterile fashion  Needle gauge: 21g.  Medications administered: 48 mg hylan 48 MG/6ML  Patient tolerance: patient tolerated the procedure well with no immediate complications      Large Joint: L knee  Date/Time: 3/11/2024 3:36 PM  Consent given by: patient  Site marked: site marked  Timeout: Immediately prior to procedure a time out was called to verify the correct patient, procedure, equipment, support staff and site/side marked as required   Supporting Documentation  Indications: pain   Procedure Details  Location: knee - L knee  Preparation: Patient was prepped and draped in the usual sterile fashion  Needle gauge: 21g.  Medications administered: 48 mg hylan 48 MG/6ML  Patient tolerance: patient tolerated the procedure well with no immediate complications              Assessment and Plan   Problem List Items Addressed This Visit    None  Visit Diagnoses       Chronic pain of both knees    -  Primary    Relevant Orders    Large Joint: R knee    Large Joint: L knee            Follow Up   Return in about 6 weeks (around 2024).    Patient is a non-smoker, did not discuss options for smoking cessation.     Social History     Socioeconomic History    Marital status:     Number of children: 3   Tobacco Use    Smoking status: Former     Current packs/day: 0.00     Average packs/day: 0.3 packs/day for 1 year (0.3 ttl pk-yrs)     Types: Cigarettes     Start date: 10/10/1970     Quit date: 10/10/1971     Years since quittin.4    Smokeless tobacco: Never    Tobacco comments:     Teen   Vaping Use    Vaping status: Never Used   Substance and Sexual Activity    Alcohol use: Yes     Alcohol/week: 2.0 standard drinks of alcohol     Types: 2 Cans of beer per week     Comment: Occasional    Drug use: Never    Sexual activity: Yes     Partners: Female     Birth control/protection: Condom     Comment: Spouse only       Patient Instructions   Discussed  treatment plan of care with patient.    Discussed with patient that due to potential for surgery I advised against a steroid injection in office today, however he is able to have viscosupplementation and feels like to try it.  He has never had this in the past.  Patient is agreeable to injections.    Patient received viscosupplementation injection today in office into the bilateral knee and tolerated the procedure well without complication.  Counseled that these injections can be given every 6 months and 1 day with insurance approval. Pt can also receive steroid injections intermittently between these doses.  Steroid injections can be given every 3 months.    Follow up in 6 weeks to evaluate effectiveness of injections versus getting another steroid injection. Call with questions, concerns, or worsening symptoms.   Patient was given instructions and counseling regarding his condition or for health maintenance advice. Please see specific information pulled into the AVS if appropriate.

## 2024-03-19 NOTE — PATIENT INSTRUCTIONS
Discussed treatment plan of care with patient.    Discussed with patient that due to potential for surgery I advised against a steroid injection in office today, however he is able to have viscosupplementation and feels like to try it.  He has never had this in the past.  Patient is agreeable to injections.    Patient received viscosupplementation injection today in office into the bilateral knee and tolerated the procedure well without complication.  Counseled that these injections can be given every 6 months and 1 day with insurance approval. Pt can also receive steroid injections intermittently between these doses.  Steroid injections can be given every 3 months.    Follow up in 6 weeks to evaluate effectiveness of injections versus getting another steroid injection. Call with questions, concerns, or worsening symptoms.

## 2024-04-22 ENCOUNTER — OFFICE VISIT (OUTPATIENT)
Dept: ORTHOPEDIC SURGERY | Facility: CLINIC | Age: 74
End: 2024-04-22
Payer: MEDICARE

## 2024-04-22 VITALS
SYSTOLIC BLOOD PRESSURE: 150 MMHG | WEIGHT: 207.2 LBS | DIASTOLIC BLOOD PRESSURE: 80 MMHG | OXYGEN SATURATION: 94 % | BODY MASS INDEX: 34.52 KG/M2 | HEIGHT: 65 IN | HEART RATE: 67 BPM

## 2024-04-22 DIAGNOSIS — G89.29 CHRONIC PAIN OF BOTH KNEES: Primary | ICD-10-CM

## 2024-04-22 DIAGNOSIS — M25.561 CHRONIC PAIN OF BOTH KNEES: Primary | ICD-10-CM

## 2024-04-22 DIAGNOSIS — M25.562 CHRONIC PAIN OF BOTH KNEES: Primary | ICD-10-CM

## 2024-04-25 NOTE — PATIENT INSTRUCTIONS
Discussed treatment plan of care with patient.  Patient advised on 3 months duration between steroid injections in 6 months duration between gel injections.  Continue home exercises.    Patient may follow-up as needed for worsening symptoms or failure to improve.  Call for questions, concerns or worsening symptoms.  No x-rays needed.

## 2024-04-25 NOTE — PROGRESS NOTES
"Chief Complaint  Follow-up of the Right Knee and Follow-up of the Left Knee    Subjective      Lawrence Olsen presents to [facility] for follow-up of bilateral knee pain/osteoarthritis that he has been managing conservatively with intermittent injections.  He last received a gel injection on 3/11/2024 and reports that it gave him quite a bit of relief but he continues to have some discomfort.  Reports that the discomfort he is experiencing is not enough to require injection today.  Reports that he had a CT scanning of his abdomen which revealed no aneurysm at this time.    Objective   No Known Allergies    Vital Signs:   /80   Pulse 67   Ht 165.1 cm (65\")   Wt 94 kg (207 lb 3.2 oz)   SpO2 94%   BMI 34.48 kg/m²     Please follow-up with PCP regarding blood pressure.    Physical Exam    Constitutional: Awake, alert. Well nourished appearance.    Integumentary: Warm, dry, intact. No obvious rashes.    HENT: Atraumatic, normocephalic.   Respiratory: Non labored respirations .   Cardiovascular: Intact peripheral pulses.    Psychiatric: Normal mood and affect. A&O X3    Ortho Exam  Bilateral knees: Range of motion 0 to 115 degrees.  Stable to varus and valgus stress.  Stable to anterior posterior drawer test.  Neurovascular intact.  Sensation is intact.  No edema noted.  Nontender to palpation the medial lateral joint line.    Imaging Results (Most Recent)       None                      Assessment and Plan   Problem List Items Addressed This Visit    None  Visit Diagnoses       Chronic pain of both knees    -  Primary            Follow Up   Return if symptoms worsen or fail to improve.    Patient is a non-smoker, did not discuss options for smoking cessation.     Social History     Socioeconomic History    Marital status:     Number of children: 3   Tobacco Use    Smoking status: Former     Current packs/day: 0.00     Average packs/day: 0.3 packs/day for 1 year (0.3 ttl " pk-yrs)     Types: Cigarettes     Start date: 10/10/1970     Quit date: 10/10/1971     Years since quittin.5    Smokeless tobacco: Never    Tobacco comments:     Teen   Vaping Use    Vaping status: Never Used   Substance and Sexual Activity    Alcohol use: Yes     Alcohol/week: 2.0 standard drinks of alcohol     Types: 2 Cans of beer per week     Comment: Occasional    Drug use: Never    Sexual activity: Yes     Partners: Female     Birth control/protection: Condom     Comment: Spouse only       Patient Instructions   Discussed treatment plan of care with patient.  Patient advised on 3 months duration between steroid injections in 6 months duration between gel injections.  Continue home exercises.    Patient may follow-up as needed for worsening symptoms or failure to improve.  Call for questions, concerns or worsening symptoms.  No x-rays needed.  Patient was given instructions and counseling regarding his condition or for health maintenance advice. Please see specific information pulled into the AVS if appropriate.

## 2024-07-12 ENCOUNTER — OFFICE VISIT (OUTPATIENT)
Dept: UROLOGY | Facility: CLINIC | Age: 74
End: 2024-07-12
Payer: MEDICARE

## 2024-07-12 ENCOUNTER — OFFICE VISIT (OUTPATIENT)
Dept: SURGERY | Facility: CLINIC | Age: 74
End: 2024-07-12
Payer: MEDICARE

## 2024-07-12 ENCOUNTER — HOSPITAL ENCOUNTER (OUTPATIENT)
Dept: ULTRASOUND IMAGING | Facility: HOSPITAL | Age: 74
Discharge: HOME | End: 2024-07-12
Payer: MEDICARE

## 2024-07-12 ENCOUNTER — PREP FOR SURGERY (OUTPATIENT)
Dept: OTHER | Facility: HOSPITAL | Age: 74
End: 2024-07-12
Payer: MEDICARE

## 2024-07-12 VITALS
WEIGHT: 207 LBS | BODY MASS INDEX: 34.49 KG/M2 | HEIGHT: 65 IN | HEART RATE: 67 BPM | SYSTOLIC BLOOD PRESSURE: 125 MMHG | DIASTOLIC BLOOD PRESSURE: 81 MMHG

## 2024-07-12 VITALS
HEART RATE: 66 BPM | DIASTOLIC BLOOD PRESSURE: 65 MMHG | SYSTOLIC BLOOD PRESSURE: 119 MMHG | HEIGHT: 65 IN | BODY MASS INDEX: 33.72 KG/M2 | TEMPERATURE: 98 F | WEIGHT: 202.4 LBS

## 2024-07-12 DIAGNOSIS — Z86.010 HISTORY OF COLONIC POLYPS: ICD-10-CM

## 2024-07-12 DIAGNOSIS — Z12.11 SCREENING FOR MALIGNANT NEOPLASM OF COLON: ICD-10-CM

## 2024-07-12 DIAGNOSIS — Z12.11 SCREENING FOR MALIGNANT NEOPLASM OF COLON: Primary | ICD-10-CM

## 2024-07-12 DIAGNOSIS — N28.9 RENAL LESION: ICD-10-CM

## 2024-07-12 DIAGNOSIS — N13.8 BPH WITH OBSTRUCTION/LOWER URINARY TRACT SYMPTOMS: Primary | ICD-10-CM

## 2024-07-12 DIAGNOSIS — N40.1 BPH WITH OBSTRUCTION/LOWER URINARY TRACT SYMPTOMS: Primary | ICD-10-CM

## 2024-07-12 DIAGNOSIS — N28.9 RENAL LESION: Primary | ICD-10-CM

## 2024-07-12 PROBLEM — Z86.0100 HISTORY OF COLONIC POLYPS: Status: ACTIVE | Noted: 2024-07-12

## 2024-07-12 LAB
BILIRUB BLD-MCNC: NEGATIVE MG/DL
CLARITY, POC: CLEAR
COLOR UR: YELLOW
EXPIRATION DATE: NORMAL
GLUCOSE UR STRIP-MCNC: NEGATIVE MG/DL
KETONES UR QL: NEGATIVE
LEUKOCYTE EST, POC: NEGATIVE
Lab: NORMAL
NITRITE UR-MCNC: NEGATIVE MG/ML
PH UR: 6 [PH] (ref 5–8)
PROT UR STRIP-MCNC: NEGATIVE MG/DL
PSA SERPL-MCNC: 1.81 NG/ML (ref 0–4)
RBC # UR STRIP: NEGATIVE /UL
SP GR UR: 1.02 (ref 1–1.03)
UROBILINOGEN UR QL: NORMAL

## 2024-07-12 PROCEDURE — 76775 US EXAM ABDO BACK WALL LIM: CPT

## 2024-07-12 PROCEDURE — 84153 ASSAY OF PSA TOTAL: CPT | Performed by: UROLOGY

## 2024-07-12 RX ORDER — SODIUM CHLORIDE 0.9 % (FLUSH) 0.9 %
10 SYRINGE (ML) INJECTION AS NEEDED
OUTPATIENT
Start: 2024-07-12

## 2024-07-12 RX ORDER — POLYETHYLENE GLYCOL 3350 17 G/17G
POWDER, FOR SOLUTION ORAL
Qty: 238 PACKET | Refills: 0 | Status: SHIPPED | OUTPATIENT
Start: 2024-07-12

## 2024-07-12 RX ORDER — SODIUM CHLORIDE 0.9 % (FLUSH) 0.9 %
3 SYRINGE (ML) INJECTION EVERY 12 HOURS SCHEDULED
OUTPATIENT
Start: 2024-07-12

## 2024-07-12 RX ORDER — CYCLOBENZAPRINE HCL 10 MG
TABLET ORAL
COMMUNITY
Start: 2024-03-08

## 2024-07-12 RX ORDER — TAMSULOSIN HYDROCHLORIDE 0.4 MG/1
CAPSULE ORAL
Qty: 90 CAPSULE | Refills: 3 | Status: SHIPPED | OUTPATIENT
Start: 2024-07-12 | End: 2025-07-12

## 2024-07-12 RX ORDER — FINASTERIDE 5 MG/1
TABLET, FILM COATED ORAL
Qty: 90 TABLET | Refills: 3 | Status: SHIPPED | OUTPATIENT
Start: 2024-07-12 | End: 2025-07-12

## 2024-07-12 RX ORDER — SODIUM CHLORIDE 9 MG/ML
40 INJECTION, SOLUTION INTRAVENOUS AS NEEDED
OUTPATIENT
Start: 2024-07-12

## 2024-07-12 NOTE — PROGRESS NOTES
"Chief Complaint  BPH with obstruction/lower urinary tract symptoms (JUST HAD KIDNEY ULTRASOUND TODAY; WAS HAVING PAIN BUT NOW HAS RESOLVED IN THE LAST COUPLE WEEKS. NEEDS REFILL ON FINASTERIDE. Last PSA was 2022. Getting this today)    Subjective no acute distress        Lawrence Olsen presents to [facility]  History of Present Illness    Patient is here for evaluation of BPH with obstructive uropathy.  Patient is on finasteride 5 mg daily and tamsulosin 0.4 mg PCHS daily.  Patient does not get up at night to urinate.  He does have weak stream and some urgency.  Patient has some frequency and empties his bladder about 60% of the time.  No dysuria or gross hematuria.  AUA score is 11/35 quality score is 3 which is mixed.  I have offered him cystoscopy but he has to have operation on his knee joints and other problems and is not interested at this time.    Objective no acute distress  Vital Signs:   /65 (BP Location: Left arm, Patient Position: Sitting, Cuff Size: Adult)   Pulse 66   Temp 98 °F (36.7 °C) (Temporal)   Ht 165.1 cm (65\")   Wt 91.8 kg (202 lb 6.4 oz)   BMI 33.68 kg/m²     No Known Allergies   Past medical history:  has a past medical history of Ankle sprain (Unknown), Arthritis, Arthritis of back (2020), Bladder disorder, BPH with urinary obstruction, Cancer, Chest pain, Elevated PSA (07/21/2017), HBP (high blood pressure), High cholesterol, Hip arthrosis (2017), Knee swelling (2020), Limb pain, Limb swelling, Low back strain (?), Prostate disorder, and Tear of meniscus of knee (12/23).   Past surgical history:  has a past surgical history that includes Cholecystectomy; Colonoscopy; Gallbladder surgery; and Hernia repair (Bilateral).  Personal history: family history includes Cancer in his brother, father, and sister; Colon cancer (age of onset: 50) in his father and sister; Heart attack in his father; Kidney nephrosis in his mother.  Social history:  reports that " he quit smoking about 52 years ago. His smoking use included cigarettes. He started smoking about 53 years ago. He has a 0.3 pack-year smoking history. He has never used smokeless tobacco. He reports current alcohol use of about 2.0 standard drinks of alcohol per week. He reports that he does not use drugs.    Review of Systems    No change from before    Physical Exam  Constitutional:       General: He is not in acute distress.     Appearance: Normal appearance. He is obese. He is not ill-appearing or toxic-appearing.   HENT:      Head: Normocephalic and atraumatic.      Ears:      Comments: No loss of hearing  Abdominal:      Palpations: Abdomen is soft. There is no mass.      Tenderness: There is no abdominal tenderness. There is no right CVA tenderness or left CVA tenderness.      Hernia: A hernia is present.      Comments: Patient has divarication of recti and umbilical hernia   Genitourinary:     Comments: Penis is normal    Right and left scrotum is normal.    Right left testicle and epididymis is normal.    MICKY.  Prostate gland is just about 35 g and benign  Musculoskeletal:         General: Normal range of motion.   Skin:     General: Skin is warm.      Coloration: Skin is not jaundiced.   Neurological:      General: No focal deficit present.      Mental Status: He is alert and oriented to person, place, and time.      Motor: No weakness.      Gait: Gait normal.   Psychiatric:         Mood and Affect: Mood normal.         Behavior: Behavior normal.         Thought Content: Thought content normal.         Judgment: Judgment normal.        Result Review :                 Assessment and Plan    Diagnoses and all orders for this visit:    1. BPH with obstruction/lower urinary tract symptoms (Primary)  -     POC Urinalysis Dipstick, Automated  -     finasteride (PROSCAR) 5 MG tablet; 1 tablet daily  Dispense: 90 tablet; Refill: 3  -     tamsulosin (FLOMAX) 0.4 MG capsule 24 hr capsule; 1 tablet PCHS daily   Dispense: 90 capsule; Refill: 3    I will see him in December.  PSA is done and I will call him on Monday.  Patient is on finasteride so the PSA will be half of the original PSA.     Brief Urine Lab Results  (Last result in the past 365 days)        Color   Clarity   Blood   Leuk Est   Nitrite   Protein   CREAT   Urine HCG        07/12/24 1359 Yellow   Clear   Negative   Negative   Negative   Negative                    Follow Up   No follow-ups on file.  Patient was given instructions and counseling regarding his condition or for health maintenance advice. Please see specific information pulled into the AVS if appropriate.     Raffy Samuels MD

## 2024-07-12 NOTE — PROGRESS NOTES
Chief Complaint: Colonoscopy    Subjective      Colonoscopy consultation       History of Present Illness  Lawrence Olsen is a 74 y.o. male presents to [facility] for colonoscopy consultation.    Patient presents today without complaints for screening colonoscopy.  Patient denies any abdominal pain, change in bowel habit, or rectal bleeding.  Admits to family history of colon cancer with his sister.  Admits to history of colonic polyps.    Patient does admit to BABS.  He does use a CPAP.    Denies any cardiac issues.  Denies taking a GLP-1 receptors.    Patient does walk into the office today with imaging from [facility] that is abnormal.  Patient has a lesion on his right kidney.  I will go ahead and get a CT scan.  I will call the patient with results.    4/21: colonoscopy (mica): Sigmoid- tubular adenoma; diverticulosis; internal hemorrhoids.       Objective     Past Medical History:   Diagnosis Date    Ankle sprain Unknown    Arthritis     Arthritis of back 2020    Bladder disorder     BPH with urinary obstruction     Cancer     Chest pain     Elevated PSA 07/21/2017    HBP (high blood pressure)     High cholesterol     Hip arthrosis 2017    Knee swelling 2020    Limb pain     Limb swelling     Low back strain ?    Prostate disorder     Tear of meniscus of knee 12/23       Past Surgical History:   Procedure Laterality Date    CHOLECYSTECTOMY      COLONOSCOPY      GALLBLADDER SURGERY      HERNIA REPAIR Bilateral        Outpatient Medications Marked as Taking for the 7/12/24 encounter (Office Visit) with Jessica Pizarro, APRJANETH   Medication Sig Dispense Refill    amLODIPine-benazepril (LOTREL) 10-20 MG per capsule Lotrel 10-20 mg oral capsule take 1 capsule by oral route once daily   Active      cyclobenzaprine (FLEXERIL) 10 MG tablet Take 1 tablet 3 times a day by oral route as needed for 30 days.      finasteride (PROSCAR) 5 MG tablet 1 tablet daily 90 tablet 3    tamsulosin  "(FLOMAX) 0.4 MG capsule 24 hr capsule 1 tablet PCHS daily 90 capsule 3       No Known Allergies     Family History   Problem Relation Age of Onset    Kidney nephrosis Mother     Cancer Father     Heart attack Father     Colon cancer Father 50    Colon cancer Sister 50    Cancer Brother     Cancer Sister        Social History     Socioeconomic History    Marital status:     Number of children: 3   Tobacco Use    Smoking status: Former     Current packs/day: 0.00     Average packs/day: 0.3 packs/day for 1 year (0.3 ttl pk-yrs)     Types: Cigarettes     Start date: 10/10/1970     Quit date: 10/10/1971     Years since quittin.7    Smokeless tobacco: Never    Tobacco comments:     Teen   Vaping Use    Vaping status: Never Used   Substance and Sexual Activity    Alcohol use: Yes     Alcohol/week: 2.0 standard drinks of alcohol     Types: 2 Cans of beer per week     Comment: Occasional    Drug use: Never    Sexual activity: Yes     Partners: Female     Birth control/protection: Condom     Comment: Spouse only       Review of Systems   Constitutional:  Negative for chills and fever.   Gastrointestinal:  Negative for abdominal distention, abdominal pain, anal bleeding, blood in stool, constipation, diarrhea and rectal pain.        Vital Signs:   /81 (BP Location: Left arm, Patient Position: Sitting, Cuff Size: Adult)   Pulse 67   Ht 165.1 cm (65\")   Wt 93.9 kg (207 lb)   BMI 34.45 kg/m²      Physical Exam  Vitals and nursing note reviewed.   Constitutional:       General: He is not in acute distress.     Appearance: Normal appearance. He is not ill-appearing.   HENT:      Head: Normocephalic and atraumatic.   Cardiovascular:      Rate and Rhythm: Normal rate.   Pulmonary:      Effort: Pulmonary effort is normal.      Breath sounds: No stridor.   Abdominal:      Palpations: Abdomen is soft.      Tenderness: There is no guarding.   Musculoskeletal:         General: No deformity. Normal range of motion. "   Skin:     General: Skin is warm and dry.      Coloration: Skin is not jaundiced.   Neurological:      General: No focal deficit present.      Mental Status: He is alert and oriented to person, place, and time.   Psychiatric:         Mood and Affect: Mood normal.         Thought Content: Thought content normal.          Result Review :          []  Laboratory  []  Radiology  []  Pathology  []  Microbiology  []  EKG/Telemetry   []  Cardiology/Vascular   []  Old records  I spent 15 minutes caring for Lawrence on this date of service. This time includes time spent by me in the following activities: reviewing tests, obtaining and/or reviewing a separately obtained history, performing a medically appropriate examination and/or evaluation, ordering medications, tests, or procedures, and documenting information in the medical record.     Assessment and Plan    Diagnoses and all orders for this visit:    1. Renal lesion (Primary)  -     Cancel: CT Abdomen Pelvis With Contrast; Future  -     US Renal Bilateral; Future    2. Screening for malignant neoplasm of colon    3. History of colonic polyps    Other orders  -     polyethylene glycol (MIRALAX) 17 g packet; Take as directed.  Instructions given in office.  Dispense: 238 g bottle  Dispense: 238 packet; Refill: 0    Today I did discuss the patient that there is a renal lesion on his right kidney.  Went ahead and ordered a renal ultrasound to evaluate this lesion a little closer.  I told the patient I would follow-up with him via phone after imaging is returned.      Follow Up   Return for schedule colonoscopy with Dr. Mckay 9/9/2024 [facility], [place] .    Hospital arrival time: 0630.    Possible risks/complications, benefits, and alternatives to surgical or invasive procedures have been explained to patient and/or legal guardian.    Patient has been evaluated and can tolerate anesthesia and/or sedation. Risks, benefits, and alternatives to anesthesia and sedation  have been explained to the patient and/or legal guardian. Patient verbalizes understanding and is willing to proceed with the above plan.     Patient was given instructions and counseling regarding his condition or for health maintenance advice. Please see specific information pulled into the AVS if appropriate.

## 2024-08-12 ENCOUNTER — OFFICE VISIT (OUTPATIENT)
Dept: ORTHOPEDIC SURGERY | Facility: CLINIC | Age: 74
End: 2024-08-12
Payer: MEDICARE

## 2024-08-12 VITALS
OXYGEN SATURATION: 95 % | SYSTOLIC BLOOD PRESSURE: 156 MMHG | DIASTOLIC BLOOD PRESSURE: 90 MMHG | WEIGHT: 202 LBS | HEIGHT: 65 IN | BODY MASS INDEX: 33.66 KG/M2 | HEART RATE: 91 BPM

## 2024-08-12 DIAGNOSIS — M25.511 RIGHT SHOULDER PAIN, UNSPECIFIED CHRONICITY: Primary | ICD-10-CM

## 2024-08-12 DIAGNOSIS — M75.111 INCOMPLETE TEAR OF RIGHT ROTATOR CUFF, UNSPECIFIED WHETHER TRAUMATIC: ICD-10-CM

## 2024-08-12 PROCEDURE — 20610 DRAIN/INJ JOINT/BURSA W/O US: CPT | Performed by: ORTHOPAEDIC SURGERY

## 2024-08-12 PROCEDURE — 99213 OFFICE O/P EST LOW 20 MIN: CPT | Performed by: ORTHOPAEDIC SURGERY

## 2024-08-12 RX ORDER — LIDOCAINE HYDROCHLORIDE 10 MG/ML
5 INJECTION, SOLUTION INFILTRATION; PERINEURAL
Status: COMPLETED | OUTPATIENT
Start: 2024-08-12 | End: 2024-08-12

## 2024-08-12 RX ORDER — ACETAMINOPHEN 500 MG
500 TABLET ORAL EVERY 6 HOURS PRN
COMMUNITY

## 2024-08-12 RX ORDER — TRIAMCINOLONE ACETONIDE 40 MG/ML
40 INJECTION, SUSPENSION INTRA-ARTICULAR; INTRAMUSCULAR
Status: COMPLETED | OUTPATIENT
Start: 2024-08-12 | End: 2024-08-12

## 2024-08-12 RX ADMIN — LIDOCAINE HYDROCHLORIDE 5 ML: 10 INJECTION, SOLUTION INFILTRATION; PERINEURAL at 14:35

## 2024-08-12 RX ADMIN — TRIAMCINOLONE ACETONIDE 40 MG: 40 INJECTION, SUSPENSION INTRA-ARTICULAR; INTRAMUSCULAR at 14:35

## 2024-08-12 NOTE — PROGRESS NOTES
"Chief Complaint  Initial Evaluation of the Right Shoulder     Subjective      Lawrence Olsen presents to [facility] for initial evaluation of the right shoulder. He lifted a bicycle to hang his bike he felt a pop in the right shoulder.  He noted before the injury he had no problems with the shoulder.  The injection was about a month ago.  He has had a MRI and X ray and is here to review.  He notes that the shoulder has gotten a little better.     No Known Allergies     Social History     Socioeconomic History    Marital status:     Number of children: 3   Tobacco Use    Smoking status: Former     Current packs/day: 0.00     Average packs/day: 0.3 packs/day for 1 year (0.3 ttl pk-yrs)     Types: Cigarettes     Start date: 10/10/1970     Quit date: 10/10/1971     Years since quittin.8    Smokeless tobacco: Never    Tobacco comments:     Teen   Vaping Use    Vaping status: Never Used   Substance and Sexual Activity    Alcohol use: Yes     Alcohol/week: 2.0 standard drinks of alcohol     Types: 2 Cans of beer per week     Comment: Occasional    Drug use: Never    Sexual activity: Yes     Partners: Female     Birth control/protection: Condom     Comment: Spouse only        I reviewed the patient's chief complaint, history of present illness, review of systems, past medical history, surgical history, family history, social history, medications, and allergy list.     Review of Systems     Constitutional: Denies fevers, chills, weight loss  Cardiovascular: Denies chest pain, shortness of breath  Skin: Denies rashes, acute skin changes  Neurologic: Denies headache, loss of consciousness        Vital Signs:   /90   Pulse 91   Ht 165.1 cm (65\")   Wt 91.6 kg (202 lb)   SpO2 95%   BMI 33.61 kg/m²          Physical Exam  General: Alert. No acute distress    Ortho Exam        RIGHT SHOULDER Forward flexion 160 with pain. Abduction 90. External rotation 40. Internal rotation " L3. Positive Cross body adduction. Supraspinatus strength 4+/5. Infraspinatus Strength 5/5. Infrared subscap 5/5. Positive Betts. Positive Neer. Negative Apprehension. Negative Lift off. (Negative Obriens. Sensation intact to light touch, median, radial, ulnar nerve. Positive AIN, PIN, ulnar nerve motor. Positive pulses. Positive Impingement signs. Good strength in triceps, biceps, deltoid, wrist extensors and wrist flexors. Tender to palpation to anterior aspect of the shoulder and down the arm.        Large Joint  Date/Time: 8/12/2024 2:35 PM  Consent given by: patient  Site marked: site marked  Timeout: Immediately prior to procedure a time out was called to verify the correct patient, procedure, equipment, support staff and site/side marked as required   Supporting Documentation  Indications: pain   Procedure Details  Location: shoulder (RIGHT) -   Preparation: Patient was prepped and draped in the usual sterile fashion  Needle gauge: 21 G.  Medications administered: 5 mL lidocaine 1 %; 40 mg triamcinolone acetonide 40 MG/ML  Patient tolerance: patient tolerated the procedure well with no immediate complications    This injection documentation was Scribed for Karl Cortes MD by Marina Soto MA.  08/12/24   14:35 EDT        Imaging Results (Most Recent)       None             Result Review :     X ray 7/15/24 [facility]  PROCEDURE: XR SHOULDER 2 OR MORE VIEWS [id number]  REASON FOR EXAM: S46.911A STR UNSP MUSC/FSC/TEND AT SHLD/UP ARM R ARM INIT  COMPARISON: None.  FINDINGS: Two views of the right shoulder were obtained. Degenerative changes are noted of the acromioclavicular and  glenohumeral joint. Right hemithorax is unremarkable. No fracture or dislocation.  IMPRESSION: Osteoarthritic changes. No acute osseous findings.  Interpreting Physician:WINSOME Rudolphgned: 07/15/2024 01:35 [facility]:Thank you for visiting our imaging center    [facility] 7/26/24    COMPARISON: None  FINDINGS: There is moderate tendinosis  of the infraspinatus. There is moderate tendinosis of the supraspinatus. There is a fullthickness tear with retraction by 1.3 cm of the supraspinatus on series 4, image 13. There is moderate tendinosis of the  subscapularis. The extra-articular long of the biceps demonstrates tendinosis with medial subluxation on series 3, image 11  proximally.  The biceps labral anchor demonstrates a small focal tear on series 4, image 15. There is tendinosis of the intra-articular long of the  biceps. The posterior labrum demonstrates blunting and fraying. The anterior labrum demonstrates blunting and fraying. There is  moderate thinning of the cartilage over the glenoid with fraying and fissuring. There is mild-to-moderate thinning of the cartilage  over the head of the humerus with fraying and fissuring. There is glenohumeral arthrosis.  Advanced acromioclavicular arthrosis. Moderate to large amount of fluid noted in the subacromial subdeltoid bursa operating  Signal in the skin is normal. Signal in the subcutaneous fat is normal. Signal in the rotator cuff muscles is normal. The marrow  signal is normal.  IMPRESSION:  Tendinosis of the supraspinatus, infraspinatus and subscapularis  High-grade partial-thickness distal supraspinatus tear  Interstitial 2 cm distal subscapularis tear  Glenohumeral arthrosis  Acromioclavicular arthrosis  Subacromial subdeltoid bursitis            Assessment and Plan     Diagnoses and all orders for this visit:    1. Right shoulder pain, unspecified chronicity (Primary)    2. Incomplete tear of right rotator cuff, unspecified whether traumatic        Discussed the treatment plan with the patient.     Discussed the risks and benefits of conservative measures. The patient expressed understanding and wished to proceed with a right shoulder steroid injection.  He tolerated the injection well.     Discussed the treatment options with the patient, operative vs non-operative.     Call or return if  worsening symptoms.    Follow Up     4-6 weeks assess if the injection is helpful.       Patient was given instructions and counseling regarding his condition or for health maintenance advice. Please see specific information pulled into the AVS if appropriate.     Scribed for Karl Cortes MD by Lara Mattson MA.  08/12/24   14:17 EDT    I have personally performed the services described in this document as scribed by the above individual and it is both accurate and complete. Karl Cortes MD 08/12/24

## 2024-09-04 NOTE — PRE-PROCEDURE INSTRUCTIONS
"Instructed on date and arrival time of 0630. Instructed that arrival time is not their procedure time but allows time to prepare for procedure.  Come to entrance \"C\". Must have  over age 18 to drive home.  May have two visitors; however, children under 12 must stay in waiting room.  Discussed clear liquid diet (no red or purple), bowel prep, and NPO.  May take medications as usual except for blood thinners, diabetic medications, and weight loss medications.  Verbalized understanding of instructions given.  Instructed to call for questions or concerns.  "

## 2024-09-06 ENCOUNTER — ANESTHESIA EVENT (OUTPATIENT)
Dept: GASTROENTEROLOGY | Facility: HOSPITAL | Age: 74
End: 2024-09-06
Payer: MEDICARE

## 2024-09-06 NOTE — ANESTHESIA PREPROCEDURE EVALUATION
Anesthesia Evaluation     Patient summary reviewed and Nursing notes reviewed   NPO Solid Status: > 8 hours  NPO Liquid Status: > 8 hours           Airway   Mallampati: I  TM distance: >3 FB  Neck ROM: full  No difficulty expected  Dental          Pulmonary     breath sounds clear to auscultation  (+) a smoker Former,  Cardiovascular   Exercise tolerance: good (4-7 METS)    Rhythm: regular  Rate: normal    (+) hypertension poorly controlled less than 2 medications, hyperlipidemia      Neuro/Psych  GI/Hepatic/Renal/Endo      ROS Comment: H/O COLON POLYPS     Musculoskeletal     Abdominal     Abdomen: soft.   Substance History      OB/GYN      Comment: N/a      Other   arthritis,   history of cancer    ROS/Med Hx Other: No recent EKG                    Anesthesia Plan    ASA 2     general   total IV anesthesia  (Total IV Anesthesia    Patient understands anesthesia not responsible for dental damage.  )  intravenous induction     Anesthetic plan, risks, benefits, and alternatives have been provided, discussed and informed consent has been obtained with: patient.  Pre-procedure education provided  Plan discussed with CRNA.        CODE STATUS:

## 2024-09-09 ENCOUNTER — ANESTHESIA (OUTPATIENT)
Dept: GASTROENTEROLOGY | Facility: HOSPITAL | Age: 74
End: 2024-09-09
Payer: MEDICARE

## 2024-09-09 ENCOUNTER — HOSPITAL ENCOUNTER (OUTPATIENT)
Facility: HOSPITAL | Age: 74
Setting detail: HOSPITAL OUTPATIENT SURGERY
Discharge: HOME | End: 2024-09-09
Attending: SURGERY | Admitting: SURGERY
Payer: MEDICARE

## 2024-09-09 VITALS
RESPIRATION RATE: 20 BRPM | HEIGHT: 65 IN | TEMPERATURE: 97.7 F | BODY MASS INDEX: 31.22 KG/M2 | SYSTOLIC BLOOD PRESSURE: 127 MMHG | WEIGHT: 187.39 LBS | DIASTOLIC BLOOD PRESSURE: 75 MMHG | OXYGEN SATURATION: 100 % | HEART RATE: 57 BPM

## 2024-09-09 DIAGNOSIS — Z86.010 HISTORY OF COLONIC POLYPS: ICD-10-CM

## 2024-09-09 DIAGNOSIS — Z12.11 SCREENING FOR MALIGNANT NEOPLASM OF COLON: ICD-10-CM

## 2024-09-09 PROCEDURE — 25010000002 PROPOFOL 10 MG/ML EMULSION: Performed by: NURSE ANESTHETIST, CERTIFIED REGISTERED

## 2024-09-09 PROCEDURE — 88305 TISSUE EXAM BY PATHOLOGIST: CPT | Performed by: SURGERY

## 2024-09-09 PROCEDURE — 25810000003 LACTATED RINGERS PER 1000 ML: Performed by: NURSE ANESTHETIST, CERTIFIED REGISTERED

## 2024-09-09 RX ORDER — PROPOFOL 10 MG/ML
VIAL (ML) INTRAVENOUS AS NEEDED
Status: DISCONTINUED | OUTPATIENT
Start: 2024-09-09 | End: 2024-09-09 | Stop reason: SURG

## 2024-09-09 RX ORDER — SODIUM CHLORIDE, SODIUM LACTATE, POTASSIUM CHLORIDE, CALCIUM CHLORIDE 600; 310; 30; 20 MG/100ML; MG/100ML; MG/100ML; MG/100ML
30 INJECTION, SOLUTION INTRAVENOUS CONTINUOUS
Status: DISCONTINUED | OUTPATIENT
Start: 2024-09-09 | End: 2024-09-09 | Stop reason: HOSPADM

## 2024-09-09 RX ORDER — LIDOCAINE HYDROCHLORIDE 20 MG/ML
INJECTION, SOLUTION EPIDURAL; INFILTRATION; INTRACAUDAL; PERINEURAL AS NEEDED
Status: DISCONTINUED | OUTPATIENT
Start: 2024-09-09 | End: 2024-09-09 | Stop reason: SURG

## 2024-09-09 RX ORDER — SODIUM CHLORIDE 9 MG/ML
40 INJECTION, SOLUTION INTRAVENOUS AS NEEDED
Status: DISCONTINUED | OUTPATIENT
Start: 2024-09-09 | End: 2024-09-09 | Stop reason: HOSPADM

## 2024-09-09 RX ORDER — SODIUM CHLORIDE 0.9 % (FLUSH) 0.9 %
10 SYRINGE (ML) INJECTION AS NEEDED
Status: DISCONTINUED | OUTPATIENT
Start: 2024-09-09 | End: 2024-09-09 | Stop reason: HOSPADM

## 2024-09-09 RX ORDER — SODIUM CHLORIDE 0.9 % (FLUSH) 0.9 %
3 SYRINGE (ML) INJECTION EVERY 12 HOURS SCHEDULED
Status: DISCONTINUED | OUTPATIENT
Start: 2024-09-09 | End: 2024-09-09 | Stop reason: HOSPADM

## 2024-09-09 RX ORDER — ONDANSETRON 2 MG/ML
4 INJECTION INTRAMUSCULAR; INTRAVENOUS ONCE AS NEEDED
Status: DISCONTINUED | OUTPATIENT
Start: 2024-09-09 | End: 2024-09-09 | Stop reason: HOSPADM

## 2024-09-09 RX ORDER — ONDANSETRON 4 MG/1
4 TABLET, ORALLY DISINTEGRATING ORAL ONCE AS NEEDED
Status: DISCONTINUED | OUTPATIENT
Start: 2024-09-09 | End: 2024-09-09 | Stop reason: HOSPADM

## 2024-09-09 RX ADMIN — PROPOFOL 175 MCG/KG/MIN: 10 INJECTION, EMULSION INTRAVENOUS at 08:15

## 2024-09-09 RX ADMIN — PROPOFOL 100 MG: 10 INJECTION, EMULSION INTRAVENOUS at 08:15

## 2024-09-09 RX ADMIN — LIDOCAINE HYDROCHLORIDE 50 MG: 20 INJECTION, SOLUTION EPIDURAL; INFILTRATION; INTRACAUDAL; PERINEURAL at 08:15

## 2024-09-09 RX ADMIN — SODIUM CHLORIDE, POTASSIUM CHLORIDE, SODIUM LACTATE AND CALCIUM CHLORIDE 30 ML/HR: 600; 310; 30; 20 INJECTION, SOLUTION INTRAVENOUS at 07:24

## 2024-09-09 NOTE — H&P
[facility]   HISTORY AND PHYSICAL    Patient Name: Lawrence Olsen  : 1950  MRN: [id number]  Primary Care Physician:  Devon Kaplan MD  Date of admission: 2024    Subjective   Subjective     Chief Complaint: Colon cancer screening    HPI:    Lawrence Olsen is a 74 y.o. male who presents for colon cancer screening.    Review of Systems   Respiratory:  Negative for shortness of breath.    Cardiovascular:  Negative for chest pain.       Personal History     Past Medical History:   Diagnosis Date    Ankle sprain Unknown    Arthritis     Arthritis of back     Bladder disorder     BPH with urinary obstruction     Cancer     SKIN    Chest pain     Colon polyp     Elevated PSA 2017    HBP (high blood pressure)     High cholesterol     Hip arthrosis     Knee swelling     Limb pain     Limb swelling     Low back strain ?    Prostate disorder     Tear of meniscus of knee        Past Surgical History:   Procedure Laterality Date    CHOLECYSTECTOMY      COLONOSCOPY      GALLBLADDER SURGERY      HERNIA REPAIR Bilateral        Family History: family history includes Cancer in his brother, father, and sister; Colon cancer (age of onset: 50) in his father and sister; Heart attack in his father; Kidney nephrosis in his mother. Otherwise pertinent FHx was reviewed and not pertinent to current issue.    Social History:  reports that he quit smoking about 52 years ago. His smoking use included cigarettes. He started smoking about 53 years ago. He has a 0.3 pack-year smoking history. He has never used smokeless tobacco. He reports current alcohol use of about 2.0 standard drinks of alcohol per week. He reports that he does not use drugs.    Home Medications:  acetaminophen, amLODIPine-benazepril, cyclobenzaprine, finasteride, and tamsulosin    Allergies:  No Known Allergies    Objective    Objective     Vitals:   Temp:  [97.6 °F (36.4 °C)] 97.6 °F (36.4 °C)  Heart Rate:  [60] 60  Resp:  [18]  18  BP: (153)/(82) 153/82    Physical Exam  HENT:      Head: Normocephalic.   Cardiovascular:      Rate and Rhythm: Normal rate.   Pulmonary:      Effort: Pulmonary effort is normal.   Abdominal:      Palpations: Abdomen is soft.   Musculoskeletal:         General: Normal range of motion.      Cervical back: Normal range of motion.   Skin:     General: Skin is warm.   Neurological:      General: No focal deficit present.      Mental Status: He is alert.   Psychiatric:         Mood and Affect: Mood normal.         Result Review    Result Review:  I have personally reviewed the results from the time of this admission to 9/9/2024 07:25 EDT and agree with these findings:  []  Laboratory  []  Microbiology  []  Radiology  []  EKG/Telemetry   []  Cardiology/Vascular   []  Pathology  []  Old records  []  Other:  Most notable findings include:     Assessment & Plan   Assessment / Plan     Brief Patient Summary:  Lawrence Olsen is a 74 y.o. male who presents for colon cancer screening.    Active Hospital Problems:  Active Hospital Problems    Diagnosis     Screening for malignant neoplasm of colon     History of colonic polyps        Plan:   We will proceed with a colonoscopy.  Risk benefits and alternatives were explained.    VTE Prophylaxis:  Mechanical VTE prophylaxis orders are present.        CODE STATUS:         Admission Status:  I believe this patient meets outpatient status.    Electronically signed by José Miguel Mckay MD, 09/09/24, 7:25 AM EDT.

## 2024-09-09 NOTE — ANESTHESIA POSTPROCEDURE EVALUATION
Patient: Lawrence Olsen    Procedure Summary       Date: 09/09/24 Room / Location: [facility] 3 / [facility]    Anesthesia Start: 0811 Anesthesia Stop: 0840    Procedure: COLONOSCOPY WITH POLYPECTOMIES Diagnosis:       Screening for malignant neoplasm of colon      History of colonic polyps      (Screening for malignant neoplasm of colon [Z12.11])      (History of colonic polyps [Z86.010])    Surgeons: José Miguel Mckay MD Provider: Domingo Amor CRNA    Anesthesia Type: general ASA Status: 2            Anesthesia Type: general    Vitals  Vitals Value Taken Time   /75 09/09/24 0853   Temp 36.5 °C (97.7 °F) 09/09/24 0853   Pulse 55 09/09/24 0855   Resp 20 09/09/24 0853   SpO2 100 % 09/09/24 0855   Vitals shown include unfiled device data.        Post Anesthesia Care and Evaluation    Patient location during evaluation: bedside  Patient participation: complete - patient participated  Level of consciousness: awake  Pain management: adequate    Airway patency: patent  Anesthetic complications: No anesthetic complications  PONV Status: controlled  Cardiovascular status: acceptable and stable  Respiratory status: acceptable

## 2024-09-10 LAB
CYTO UR: NORMAL
LAB AP CASE REPORT: NORMAL
LAB AP CLINICAL INFORMATION: NORMAL
PATH REPORT.FINAL DX SPEC: NORMAL
PATH REPORT.GROSS SPEC: NORMAL

## 2024-09-16 ENCOUNTER — OFFICE VISIT (OUTPATIENT)
Dept: ORTHOPEDIC SURGERY | Facility: CLINIC | Age: 74
End: 2024-09-16
Payer: MEDICARE

## 2024-09-16 VITALS
BODY MASS INDEX: 31.49 KG/M2 | DIASTOLIC BLOOD PRESSURE: 76 MMHG | WEIGHT: 189 LBS | SYSTOLIC BLOOD PRESSURE: 124 MMHG | HEART RATE: 82 BPM | HEIGHT: 65 IN | OXYGEN SATURATION: 96 %

## 2024-09-16 DIAGNOSIS — M12.811 ROTATOR CUFF TEAR ARTHROPATHY OF RIGHT SHOULDER: Primary | ICD-10-CM

## 2024-09-16 DIAGNOSIS — M75.101 ROTATOR CUFF TEAR ARTHROPATHY OF RIGHT SHOULDER: Primary | ICD-10-CM

## 2024-09-16 PROCEDURE — 99213 OFFICE O/P EST LOW 20 MIN: CPT

## 2024-09-16 PROCEDURE — 1160F RVW MEDS BY RX/DR IN RCRD: CPT

## 2024-09-16 PROCEDURE — 1159F MED LIST DOCD IN RCRD: CPT

## 2024-09-17 DIAGNOSIS — M12.811 ROTATOR CUFF TEAR ARTHROPATHY OF RIGHT SHOULDER: Primary | ICD-10-CM

## 2024-09-17 DIAGNOSIS — M25.511 RIGHT SHOULDER PAIN, UNSPECIFIED CHRONICITY: ICD-10-CM

## 2024-09-17 DIAGNOSIS — M75.101 ROTATOR CUFF TEAR ARTHROPATHY OF RIGHT SHOULDER: Primary | ICD-10-CM

## 2024-10-02 ENCOUNTER — TREATMENT (OUTPATIENT)
Dept: PHYSICAL THERAPY | Facility: CLINIC | Age: 74
End: 2024-10-02
Payer: MEDICARE

## 2024-10-02 DIAGNOSIS — M25.511 ACUTE PAIN OF RIGHT SHOULDER: ICD-10-CM

## 2024-10-02 DIAGNOSIS — S46.211A TEAR OF RIGHT BICEPS MUSCLE, INITIAL ENCOUNTER: ICD-10-CM

## 2024-10-02 DIAGNOSIS — M75.101 TEAR OF RIGHT SUPRASPINATUS TENDON: Primary | ICD-10-CM

## 2024-10-02 DIAGNOSIS — M67.813 TENDINOSIS OF RIGHT ROTATOR CUFF: ICD-10-CM

## 2024-10-02 DIAGNOSIS — M25.611 DECREASED ROM OF RIGHT SHOULDER: ICD-10-CM

## 2024-10-02 NOTE — PROGRESS NOTES
Occupational Therapy Initial Evaluation and Plan of Care  Chanelle  OT: [address]  [place] [address]    Patient: Lawrence Olsen   : 1950  Diagnosis/ICD-10 Code:  Tear of right supraspinatus tendon [M75.101]  Referring practitioner: WILLIAMS Perera  Date of Initial Visit: 10/2/2024  Today's Date: 10/2/2024  Patient seen for 1 sessions           Subjective Questionnaire: QuickDASH: 32/55      Subjective Evaluation    History of Present Illness  Mechanism of injury: Pt is a RHD 73 y/o male who presents to therapy with c/o R shoulder pain and decreased ROM. Pt reports in early July he was attempted to lift a bicycle onto a rack when he felt a pop and immediate pain in his R shoulder. MRI revealed: There is moderate tendinosis of the infraspinatus. There is moderate tendinosis of the supraspinatus. There is a fullthickness tear with retraction by 1.3 cm of the supraspinatus on series 4, image 13. There is moderate tendinosis of the subscapularis. The extra-articular long of the biceps demonstrates tendinosis with medial subluxation on series 3, image 11 proximally. The biceps labral anchor demonstrates a small focal tear on series 4, image 15. There is tendinosis of the intra-articular long of the  biceps. The posterior labrum demonstrates blunting and fraying. The anterior labrum demonstrates blunting and fraying. There is moderate thinning of the cartilage over the glenoid with fraying and fissuring. There is mild-to-moderate thinning of the cartilage over the head of the humerus with fraying and fissuring. There is glenohumeral arthrosis. Advanced acromioclavicular arthrosis. Moderate to large amount of fluid noted in the subacromial subdeltoid bursa operating signal in the skin is normal. Signal in the subcutaneous fat is normal. Signal in the rotator cuff muscles is normal. The marrow signal is normal.  Pt received an injection on  and reports this improved his symptoms but has worn  "off.  Pt is retired. Pt enjoys fishing and yard work.  PMHx: arthritis, HTN, skin cancer (12-15 years ago).      Pain  Current pain ratin  At best pain rating: 3  At worst pain ratin  Location: R shoulder  Quality: sharp and dull ache  Relieving factors: medications  Aggravating factors: outstretched reach, overhead activity, lifting and movement  Progression: improved (Since injection)    Social Support  Lives with: spouse    Hand dominance: right    Diagnostic Tests  X-ray: abnormal  MRI studies: abnormal    Treatments  Previous treatment: injection treatment  Patient Goals  Patient goals for therapy: decreased pain, increased motion, increased strength, independence with ADLs/IADLs and return to sport/leisure activities  Patient goal: \"To get back to 100%\"           Objective          Neurological Testing     Additional Neurological Details  Pt reports no current numbness/tingling but reports occasional tingling R upper arm.    Active Range of Motion     Right Shoulder   Flexion: 135 degrees with pain  Abduction: 70 degrees with pain  External rotation BTH: C7 with pain  Internal rotation BTB: lumbar with pain    Passive Range of Motion     Right Shoulder   Flexion: 20 degrees with pain  Abduction: 80 degrees with pain  External rotation 45°: 25 degrees with pain  Internal rotation 45°: WFL and with pain    Strength/Myotome Testing     Additional Strength Details  Deferred secondary to pain/confirmed tears.          Assessment & Plan       Assessment  Impairments: abnormal or restricted ROM, activity intolerance, impaired physical strength, lacks appropriate home exercise program and pain with function   Functional limitations: carrying objects, lifting, sleeping, pulling, pushing, uncomfortable because of pain, moving in bed, reaching behind back and reaching overhead   Assessment details: Pt will benefit from skilled OT secondary to decreased strength/ROM and increased pain that limits pt ability to " complete ADLs.  Prognosis: good    Goals  Plan Goals: SHOULDER  PROBLEMS:     1. The patient has limited ROM of the R shoulder.    LTG 1: 12 weeks:  The patient will demonstrate 140 degrees of active shoulder flexion, 140 degrees of shoulder abduction to allow the patient to reach into upper kitchen cabinets.    STATUS:  New   STG 1a: 8 weeks:  The patient will demonstrate 90 degrees of active shoulder abduction    STATUS:  New   TREATMENT: Manual therapy, therapeutic exercise, home exercise instruction, and modalities as needed to include: electrical stimulation, ultrasound, moist heat, and ice.    2. The patient has limited strength of the R shoulder.   LTG 2: 12 weeks:  The patient will demonstrate 5 /5 strength for R shoulder flexion, abduction, external rotation, and internal rotation in order to demonstrate improved shoulder stability.    STATUS:  New   STG 2a: 8 weeks:  The patient will demonstrate ability to tolerate MMT for R shoulder flexion, abduction, external rotation, and internal rotation.    STATUS:  New   STG2b:  8 weeks:  The patient will be independent with home exercises.     STATUS:  New   TREATMENT: Manual therapy, therapeutic exercise, home exercise instruction, and modalities as needed to include: electrical stimulation, ultrasound, moist heat, and ice.     3. The patient complains of pain to the R shoulder.   LTG 3: 12 weeks:  The patient will report a pain rating of 1 /10 or better in order to improve sleep quality and tolerance to performance of activities of daily living.    STATUS:  New   STG 3a: 8 weeks:  The patient will report a pain rating of 3 /10 or better.     STATUS:  New   TREATMENT: Manual therapy, therapeutic exercise, home exercise instruction, and modalities as needed to include: electrical stimulation, ultrasound, moist heat, and ice.    4. Carrying, Moving, and Handling Objects Functional Limitation     LTG 4: 12 weeks:  The patient will demonstrate 1-19 % limitation by  achieving a score of 19 on the QuickDASH.    STATUS:  New   STG 4a: 8 weeks:  The patient will demonstrate 20-39 % limitation by achieving a score of 27 on the QuickDASH.      STATUS:  New   TREATMENT:  Manual therapy, therapeutic exercise, home exercise instruction, and modalities as needed to include: moist heat, electrical stimulation, and ultrasound.     Plan  Planned modality interventions: cryotherapy and thermotherapy (hydrocollator packs)  Planned therapy interventions: body mechanics training, fine motor coordination training, flexibility, functional ROM exercises, home exercise program, joint mobilization, manual therapy, neuromuscular re-education, postural training, soft tissue mobilization, strengthening, stretching and therapeutic activities  Frequency: 2x week  Duration in weeks: 12  Treatment plan discussed with: patient      Pt is indicated for skilled occupational therapy services.  Timed:           Therapeutic Exercise:    9     mins  04804;          Un-Timed:  Low Eval     37     Mins  71192    Timed Treatment:   9   mins   Total Treatment:     46   mins    Start time: 1030  End time: 1116    OT SIGNATURE: Carrillo Reddy OT   DATE TREATMENT INITIATED: 10/2/2024  [place] License: [id number]    Initial Certification  Certification Period: 12/30/2024  I certify that the therapy services are furnished while this patient is under my care.  The services outlined above are required by this patient, and will be reviewed every 90 days.     PHYSICIAN: Claudia Juares APRN      DATE:   MD NPI: [id number]  Please sign and return via fax to   [phone].. Thank you, [facility] Occupational Therapy.

## 2024-10-07 ENCOUNTER — HOSPITAL ENCOUNTER (OUTPATIENT)
Dept: CARDIOLOGY | Facility: HOSPITAL | Age: 74
Discharge: HOME | End: 2024-10-07
Payer: MEDICARE

## 2024-10-07 ENCOUNTER — HOSPITAL ENCOUNTER (OUTPATIENT)
Dept: GENERAL RADIOLOGY | Facility: HOSPITAL | Age: 74
Discharge: HOME | End: 2024-10-07
Payer: MEDICARE

## 2024-10-07 ENCOUNTER — LAB (OUTPATIENT)
Dept: LAB | Facility: HOSPITAL | Age: 74
End: 2024-10-07
Payer: MEDICARE

## 2024-10-07 ENCOUNTER — TRANSCRIBE ORDERS (OUTPATIENT)
Dept: LAB | Facility: HOSPITAL | Age: 74
End: 2024-10-07
Payer: MEDICARE

## 2024-10-07 ENCOUNTER — TRANSCRIBE ORDERS (OUTPATIENT)
Dept: CARDIOLOGY | Facility: HOSPITAL | Age: 74
End: 2024-10-07
Payer: MEDICARE

## 2024-10-07 DIAGNOSIS — M25.511 RIGHT SHOULDER PAIN, UNSPECIFIED CHRONICITY: Primary | ICD-10-CM

## 2024-10-07 DIAGNOSIS — M25.511 RIGHT SHOULDER PAIN, UNSPECIFIED CHRONICITY: ICD-10-CM

## 2024-10-07 DIAGNOSIS — Z01.811 PRE-OP CHEST EXAM: ICD-10-CM

## 2024-10-07 DIAGNOSIS — Z01.811 PRE-OP CHEST EXAM: Primary | ICD-10-CM

## 2024-10-07 LAB
ALBUMIN SERPL-MCNC: 4.4 G/DL (ref 3.5–5.2)
ALBUMIN/GLOB SERPL: 1.9 G/DL
ALP SERPL-CCNC: 80 U/L (ref 39–117)
ALT SERPL W P-5'-P-CCNC: 25 U/L (ref 1–41)
ANION GAP SERPL CALCULATED.3IONS-SCNC: 14.6 MMOL/L (ref 5–15)
AST SERPL-CCNC: 25 U/L (ref 1–40)
BASOPHILS # BLD AUTO: 0.05 10*3/MM3 (ref 0–0.2)
BASOPHILS NFR BLD AUTO: 0.5 % (ref 0–1.5)
BILIRUB SERPL-MCNC: 0.8 MG/DL (ref 0–1.2)
BUN SERPL-MCNC: 22 MG/DL (ref 8–23)
BUN/CREAT SERPL: 19.1 (ref 7–25)
CALCIUM SPEC-SCNC: 10.1 MG/DL (ref 8.6–10.5)
CHLORIDE SERPL-SCNC: 101 MMOL/L (ref 98–107)
CO2 SERPL-SCNC: 24.4 MMOL/L (ref 22–29)
CREAT SERPL-MCNC: 1.15 MG/DL (ref 0.76–1.27)
DEPRECATED RDW RBC AUTO: 40.7 FL (ref 37–54)
EGFRCR SERPLBLD CKD-EPI 2021: 67.6 ML/MIN/1.73
EOSINOPHIL # BLD AUTO: 0.04 10*3/MM3 (ref 0–0.4)
EOSINOPHIL NFR BLD AUTO: 0.4 % (ref 0.3–6.2)
ERYTHROCYTE [DISTWIDTH] IN BLOOD BY AUTOMATED COUNT: 13.3 % (ref 12.3–15.4)
GLOBULIN UR ELPH-MCNC: 2.3 GM/DL
GLUCOSE SERPL-MCNC: 108 MG/DL (ref 65–99)
HCT VFR BLD AUTO: 46 % (ref 37.5–51)
HGB BLD-MCNC: 15.6 G/DL (ref 13–17.7)
IMM GRANULOCYTES # BLD AUTO: 0.06 10*3/MM3 (ref 0–0.05)
IMM GRANULOCYTES NFR BLD AUTO: 0.6 % (ref 0–0.5)
LYMPHOCYTES # BLD AUTO: 1.76 10*3/MM3 (ref 0.7–3.1)
LYMPHOCYTES NFR BLD AUTO: 18 % (ref 19.6–45.3)
MCH RBC QN AUTO: 28.7 PG (ref 26.6–33)
MCHC RBC AUTO-ENTMCNC: 33.9 G/DL (ref 31.5–35.7)
MCV RBC AUTO: 84.6 FL (ref 79–97)
MONOCYTES # BLD AUTO: 0.79 10*3/MM3 (ref 0.1–0.9)
MONOCYTES NFR BLD AUTO: 8.1 % (ref 5–12)
NEUTROPHILS NFR BLD AUTO: 7.1 10*3/MM3 (ref 1.7–7)
NEUTROPHILS NFR BLD AUTO: 72.4 % (ref 42.7–76)
NRBC BLD AUTO-RTO: 0 /100 WBC (ref 0–0.2)
PLATELET # BLD AUTO: 244 10*3/MM3 (ref 140–450)
PMV BLD AUTO: 10.3 FL (ref 6–12)
POTASSIUM SERPL-SCNC: 3.9 MMOL/L (ref 3.5–5.2)
PROT SERPL-MCNC: 6.7 G/DL (ref 6–8.5)
QT INTERVAL: 420 MS
QTC INTERVAL: 479 MS
RBC # BLD AUTO: 5.44 10*6/MM3 (ref 4.14–5.8)
SODIUM SERPL-SCNC: 140 MMOL/L (ref 136–145)
WBC NRBC COR # BLD AUTO: 9.8 10*3/MM3 (ref 3.4–10.8)

## 2024-10-07 PROCEDURE — 71046 X-RAY EXAM CHEST 2 VIEWS: CPT

## 2024-10-07 PROCEDURE — 93005 ELECTROCARDIOGRAM TRACING: CPT

## 2024-10-07 PROCEDURE — 80053 COMPREHEN METABOLIC PANEL: CPT

## 2024-10-07 PROCEDURE — 36415 COLL VENOUS BLD VENIPUNCTURE: CPT

## 2024-10-07 PROCEDURE — 85025 COMPLETE CBC W/AUTO DIFF WBC: CPT

## 2024-10-23 ENCOUNTER — DOCUMENTATION (OUTPATIENT)
Dept: PHYSICAL THERAPY | Facility: CLINIC | Age: 74
End: 2024-10-23
Payer: MEDICARE

## 2024-10-23 NOTE — PROGRESS NOTES
Discharge Summary  Discharge Summary from Occupational Therapy Report  Chanelle  OT: [address] [place] [address]    Dates  OT visit: 10/2/24  Number of Visits: 1     Discharge Status of Patient: See Progress Note dated 10/2/24    Goals: Not Met    Discharge Plan: Continue with current home exercise program as instructed  Future need for rehabilitation activities    Comments Pt did not return for therapy.    Date of Discharge 10/2/24        Carrillo Reddy OT  Occupational Therapist  [place] License:[id number]

## 2024-11-04 ENCOUNTER — LAB (OUTPATIENT)
Facility: HOSPITAL | Age: 74
End: 2024-11-04
Payer: MEDICARE

## 2024-11-04 ENCOUNTER — TRANSCRIBE ORDERS (OUTPATIENT)
Dept: ADMINISTRATIVE | Facility: HOSPITAL | Age: 74
End: 2024-11-04
Payer: MEDICARE

## 2024-11-04 DIAGNOSIS — M19.111 OSTEOARTHRITIS OF RIGHT SHOULDER DUE TO ROTATOR CUFF INJURY: ICD-10-CM

## 2024-11-04 DIAGNOSIS — S46.001S OSTEOARTHRITIS OF RIGHT SHOULDER DUE TO ROTATOR CUFF INJURY: ICD-10-CM

## 2024-11-04 DIAGNOSIS — M19.111 OSTEOARTHRITIS OF RIGHT SHOULDER DUE TO ROTATOR CUFF INJURY: Primary | ICD-10-CM

## 2024-11-04 DIAGNOSIS — S46.001S OSTEOARTHRITIS OF RIGHT SHOULDER DUE TO ROTATOR CUFF INJURY: Primary | ICD-10-CM

## 2024-11-04 LAB
ALBUMIN SERPL-MCNC: 4.3 G/DL (ref 3.5–5.2)
ALBUMIN/GLOB SERPL: 1.6 G/DL
ALP SERPL-CCNC: 63 U/L (ref 39–117)
ALT SERPL W P-5'-P-CCNC: 31 U/L (ref 1–41)
ANION GAP SERPL CALCULATED.3IONS-SCNC: 10 MMOL/L (ref 5–15)
AST SERPL-CCNC: 29 U/L (ref 1–40)
BASOPHILS # BLD AUTO: 0.04 10*3/MM3 (ref 0–0.2)
BASOPHILS NFR BLD AUTO: 0.5 % (ref 0–1.5)
BILIRUB SERPL-MCNC: 0.8 MG/DL (ref 0–1.2)
BUN SERPL-MCNC: 17 MG/DL (ref 8–23)
BUN/CREAT SERPL: 15.7 (ref 7–25)
CALCIUM SPEC-SCNC: 10.1 MG/DL (ref 8.6–10.5)
CHLORIDE SERPL-SCNC: 102 MMOL/L (ref 98–107)
CO2 SERPL-SCNC: 24 MMOL/L (ref 22–29)
CREAT SERPL-MCNC: 1.08 MG/DL (ref 0.76–1.27)
DEPRECATED RDW RBC AUTO: 40.4 FL (ref 37–54)
EGFRCR SERPLBLD CKD-EPI 2021: 72.9 ML/MIN/1.73
EOSINOPHIL # BLD AUTO: 0.1 10*3/MM3 (ref 0–0.4)
EOSINOPHIL NFR BLD AUTO: 1.4 % (ref 0.3–6.2)
ERYTHROCYTE [DISTWIDTH] IN BLOOD BY AUTOMATED COUNT: 13.1 % (ref 12.3–15.4)
GLOBULIN UR ELPH-MCNC: 2.7 GM/DL
GLUCOSE SERPL-MCNC: 96 MG/DL (ref 65–99)
HCT VFR BLD AUTO: 44.5 % (ref 37.5–51)
HGB BLD-MCNC: 15.3 G/DL (ref 13–17.7)
IMM GRANULOCYTES # BLD AUTO: 0.02 10*3/MM3 (ref 0–0.05)
IMM GRANULOCYTES NFR BLD AUTO: 0.3 % (ref 0–0.5)
LYMPHOCYTES # BLD AUTO: 1.73 10*3/MM3 (ref 0.7–3.1)
LYMPHOCYTES NFR BLD AUTO: 23.7 % (ref 19.6–45.3)
MCH RBC QN AUTO: 29 PG (ref 26.6–33)
MCHC RBC AUTO-ENTMCNC: 34.4 G/DL (ref 31.5–35.7)
MCV RBC AUTO: 84.4 FL (ref 79–97)
MONOCYTES # BLD AUTO: 0.68 10*3/MM3 (ref 0.1–0.9)
MONOCYTES NFR BLD AUTO: 9.3 % (ref 5–12)
NEUTROPHILS NFR BLD AUTO: 4.74 10*3/MM3 (ref 1.7–7)
NEUTROPHILS NFR BLD AUTO: 64.8 % (ref 42.7–76)
NRBC BLD AUTO-RTO: 0 /100 WBC (ref 0–0.2)
PLATELET # BLD AUTO: 225 10*3/MM3 (ref 140–450)
PMV BLD AUTO: 10.7 FL (ref 6–12)
POTASSIUM SERPL-SCNC: 4.2 MMOL/L (ref 3.5–5.2)
PROT SERPL-MCNC: 7 G/DL (ref 6–8.5)
RBC # BLD AUTO: 5.27 10*6/MM3 (ref 4.14–5.8)
SODIUM SERPL-SCNC: 136 MMOL/L (ref 136–145)
WBC NRBC COR # BLD AUTO: 7.31 10*3/MM3 (ref 3.4–10.8)

## 2024-11-04 PROCEDURE — 80053 COMPREHEN METABOLIC PANEL: CPT

## 2024-11-04 PROCEDURE — 36415 COLL VENOUS BLD VENIPUNCTURE: CPT

## 2024-11-04 PROCEDURE — 85025 COMPLETE CBC W/AUTO DIFF WBC: CPT

## 2024-11-25 ENCOUNTER — TRANSCRIBE ORDERS (OUTPATIENT)
Dept: PHYSICAL THERAPY | Facility: CLINIC | Age: 74
End: 2024-11-25
Payer: MEDICARE

## 2024-11-25 DIAGNOSIS — M67.813 BICEPS TENDINOSIS OF RIGHT SHOULDER: ICD-10-CM

## 2024-11-25 DIAGNOSIS — Z98.890 S/P ROTATOR CUFF REPAIR: ICD-10-CM

## 2024-11-25 DIAGNOSIS — Z98.890 S/P ARTHROSCOPY OF RIGHT SHOULDER: Primary | ICD-10-CM

## 2024-12-05 ENCOUNTER — OFFICE VISIT (OUTPATIENT)
Dept: UROLOGY | Facility: CLINIC | Age: 74
End: 2024-12-05
Payer: MEDICARE

## 2024-12-05 VITALS
DIASTOLIC BLOOD PRESSURE: 75 MMHG | WEIGHT: 207 LBS | SYSTOLIC BLOOD PRESSURE: 137 MMHG | BODY MASS INDEX: 34.49 KG/M2 | TEMPERATURE: 97.8 F | HEART RATE: 63 BPM | HEIGHT: 65 IN

## 2024-12-05 DIAGNOSIS — N13.8 BPH WITH OBSTRUCTION/LOWER URINARY TRACT SYMPTOMS: Primary | ICD-10-CM

## 2024-12-05 DIAGNOSIS — N40.1 BPH WITH OBSTRUCTION/LOWER URINARY TRACT SYMPTOMS: Primary | ICD-10-CM

## 2024-12-05 LAB
BILIRUB BLD-MCNC: NEGATIVE MG/DL
CLARITY, POC: CLEAR
COLOR UR: YELLOW
EXPIRATION DATE: NORMAL
GLUCOSE UR STRIP-MCNC: NEGATIVE MG/DL
KETONES UR QL: NEGATIVE
LEUKOCYTE EST, POC: NEGATIVE
Lab: NORMAL
NITRITE UR-MCNC: NEGATIVE MG/ML
PH UR: 7 [PH] (ref 5–8)
PROT UR STRIP-MCNC: NEGATIVE MG/DL
RBC # UR STRIP: NEGATIVE /UL
SP GR UR: 1.02 (ref 1–1.03)
UROBILINOGEN UR QL: NORMAL

## 2024-12-05 RX ORDER — TAMSULOSIN HYDROCHLORIDE 0.4 MG/1
CAPSULE ORAL
Qty: 90 CAPSULE | Refills: 1 | Status: SHIPPED | OUTPATIENT
Start: 2024-12-05 | End: 2025-12-05

## 2024-12-05 RX ORDER — FINASTERIDE 5 MG/1
TABLET, FILM COATED ORAL
Qty: 90 TABLET | Refills: 1 | Status: SHIPPED | OUTPATIENT
Start: 2024-12-05 | End: 2025-12-05

## 2024-12-05 NOTE — PROGRESS NOTES
"Chief Complaint  BPH with obstruction/lower urinary tract symptoms (Last PSA 7/12/24 was 1.810)    Subjective patient has a brace on the right side from shoulder surgery        Lawrence Olsen presents to [facility] UR[facility]  History of Present Illness    74-year-old white male is here for evaluation of his prostate.  Patient has been on finasteride 5 mg daily and tamsulosin 0.4 mg PC and is doing well.  He is getting up once at night because he cannot sleep in bed secondary to his right shoulder surgery.  Stream is still weak 50% of the time.  AUA score is 13/35 and quality score is 2.    Patient has no dysuria or gross hematuria.  No family history of prostate cancer.    PSA on 7/12/2024 was 1.810.  Patient being on finasteride it comes out to be 3.620.    Patient has right shoulder surgery and has a brace on right side restricting his examination.    Objective no acute distress  Vital Signs:   /75 (BP Location: Left arm, Patient Position: Sitting, Cuff Size: Adult)   Pulse 63   Temp 97.8 °F (36.6 °C) (Temporal)   Ht 165.1 cm (65\")   Wt 93.9 kg (207 lb)   BMI 34.45 kg/m²     No Known Allergies   Past medical history:  has a past medical history of Ankle sprain (Unknown), Arthritis, Arthritis of back (2020), Bladder disorder, BPH with urinary obstruction, Cancer, Chest pain, Colon polyp, Elevated PSA (07/21/2017), HBP (high blood pressure), High cholesterol, Hip arthrosis (2017), Knee swelling (2020), Limb pain, Limb swelling, Low back strain (?), Prostate disorder, and Tear of meniscus of knee (12/23).   Past surgical history:  has a past surgical history that includes Cholecystectomy; Colonoscopy; Gallbladder surgery; Hernia repair (Bilateral); Colonoscopy (N/A, 09/09/2024); and Shoulder arthroscopy w/ rotator cuff repair (Right).  Personal history: family history includes Cancer in his brother, father, and sister; Colon cancer (age of onset: 50) in his father and sister; Heart " attack in his father; Kidney nephrosis in his mother.  Social history:  reports that he quit smoking about 53 years ago. His smoking use included cigarettes. He started smoking about 54 years ago. He has a 0.3 pack-year smoking history. He has been exposed to tobacco smoke. He has never used smokeless tobacco. He reports current alcohol use of about 2.0 standard drinks of alcohol per week. He reports that he does not use drugs.    Review of Systems    Please see past medical and surgical history    Physical Exam  Constitutional:       General: He is not in acute distress.     Appearance: Normal appearance. He is obese. He is not ill-appearing or toxic-appearing.      Comments: Patient has a brace on the right upper arm secondary to right shoulder surgery   HENT:      Head: Normocephalic and atraumatic.      Ears:      Comments: No loss of hearing  Abdominal:      Palpations: Abdomen is soft. There is no mass.      Tenderness: There is no abdominal tenderness. There is no right CVA tenderness or left CVA tenderness.   Genitourinary:     Penis: Normal.    Musculoskeletal:         General: Normal range of motion.   Skin:     General: Skin is warm.      Coloration: Skin is not jaundiced.   Neurological:      General: No focal deficit present.      Mental Status: He is alert and oriented to person, place, and time.      Motor: No weakness.      Gait: Gait normal.   Psychiatric:         Mood and Affect: Mood normal.         Behavior: Behavior normal.         Thought Content: Thought content normal.         Judgment: Judgment normal.        Result Review :                 Assessment and Plan    Diagnoses and all orders for this visit:    1. BPH with obstruction/lower urinary tract symptoms (Primary)  -     POC Urinalysis Dipstick, Automated  -     finasteride (PROSCAR) 5 MG tablet; 1 tablet daily  Dispense: 90 tablet; Refill: 1  -     tamsulosin (FLOMAX) 0.4 MG capsule 24 hr capsule; 1 tablet PCHS daily  Dispense: 90  capsule; Refill: 1      Will refer the patient to Dr. Santiago to continue his medication.  Brief Urine Lab Results  (Last result in the past 365 days)        Color   Clarity   Blood   Leuk Est   Nitrite   Protein   CREAT   Urine HCG        12/05/24 1416 Yellow   Clear   Negative   Negative   Negative   Negative                    Follow Up   No follow-ups on file.  Patient was given instructions and counseling regarding his condition or for health maintenance advice. Please see specific information pulled into the AVS if appropriate.     Raffy Samuels MD

## 2024-12-19 ENCOUNTER — TREATMENT (OUTPATIENT)
Dept: PHYSICAL THERAPY | Facility: CLINIC | Age: 74
End: 2024-12-19
Payer: MEDICARE

## 2024-12-19 DIAGNOSIS — M25.511 ACUTE PAIN OF RIGHT SHOULDER: ICD-10-CM

## 2024-12-19 DIAGNOSIS — Z98.890 S/P RIGHT ROTATOR CUFF REPAIR: ICD-10-CM

## 2024-12-19 DIAGNOSIS — M25.611 DECREASED RANGE OF MOTION OF RIGHT SHOULDER: ICD-10-CM

## 2024-12-19 DIAGNOSIS — Z47.89 ORTHOPEDIC AFTERCARE: Primary | ICD-10-CM

## 2024-12-19 DIAGNOSIS — Z98.890 STATUS POST DECOMPRESSION OF SUBACROMIAL SPACE: ICD-10-CM

## 2024-12-19 NOTE — PROGRESS NOTES
Occupational Therapy Initial Evaluation and Plan of Care  Chanelle  OT: [address]  [place] [address]    Patient: Lawrence Olsen   : 1950  Diagnosis/ICD-10 Code:  Orthopedic aftercare [Z47.89]  Referring practitioner: Ramses Chery MD  Date of Initial Visit: 2024  Today's Date: 2024  Patient seen for 1 sessions           Subjective Questionnaire: QuickDASH:       Subjective Evaluation    History of Present Illness  Date of surgery: 2024  Mechanism of injury:  Pt is a RHD 75 y/o male who presents to therapy with c/o R shoulder pain and decreased ROM. Pt reports in early July he was attempted to lift a bicycle onto a rack when he felt a pop and immediate pain in his R shoulder. MRI revealed: There is moderate tendinosis of the infraspinatus. There is moderate tendinosis of the supraspinatus. There is a fullthickness tear with retraction by 1.3 cm of the supraspinatus on series 4, image 13. There is moderate tendinosis of the subscapularis. The extra-articular long of the biceps demonstrates tendinosis with medial subluxation on series 3, image 11 proximally. The biceps labral anchor demonstrates a small focal tear on series 4, image 15. There is tendinosis of the intra-articular long of the  biceps. The posterior labrum demonstrates blunting and fraying. The anterior labrum demonstrates blunting and fraying. There is moderate thinning of the cartilage over the glenoid with fraying and fissuring. There is mild-to-moderate thinning of the cartilage over the head of the humerus with fraying and fissuring. There is glenohumeral arthrosis. Advanced acromioclavicular arthrosis. Moderate to large amount of fluid noted in the subacromial subdeltoid bursa operating signal in the skin is normal. Signal in the subcutaneous fat is normal. Signal in the rotator cuff muscles is normal. The marrow signal is normal.  Pt received an injection on  and reports this improved his symptoms but  "has worn off. Pt elected to undergo RTC repair, biceps tenodesis, SAD, and labral debridement on .  Pt is retired. Pt enjoys fishing and yard work.  PMHx: arthritis, HTN, skin cancer (12-15 years ago).      Pain  Current pain ratin  At best pain ratin  At worst pain ratin  Location: R shoulder  Quality: sharp and dull ache  Relieving factors: medications  Aggravating factors: movement  Progression: improved    Social Support  Lives with: spouse    Hand dominance: right    Diagnostic Tests  MRI studies: abnormal    Treatments  Treatments tried: OT.  Patient Goals  Patient goals for therapy: decreased pain, increased motion, increased strength, independence with ADLs/IADLs and return to sport/leisure activities  Patient goal: \"Get back to full rotation\"           Objective          Observations     Additional Shoulder Observation Details  Pt presents in immobilizer to R shoulder. Pt reports incisions are well healing with no concerning signs for infection.    Neurological Testing     Additional Neurological Details  Pt reports no current numbness/tingling.    Active Range of Motion     Right Elbow   Normal active range of motion  Forearm supination: with pain    Additional Active Range of Motion Details  Deferred secondary to early post op status.    Passive Range of Motion     Right Shoulder   Flexion: 50 degrees   Abduction: 40 degrees   External rotation 0°: 15 degrees     Strength/Myotome Testing     Additional Strength Details  Deferred secondary to early post op status.  Deferred secondary to early post op status.            Assessment & Plan       Assessment  Impairments: abnormal or restricted ROM, activity intolerance, impaired physical strength, lacks appropriate home exercise program and pain with function   Functional limitations: carrying objects, lifting, sleeping, pulling, pushing, uncomfortable because of pain, moving in bed, reaching behind back and reaching overhead   Assessment " details: Pt will benefit from skilled OT secondary to decreased strength/ROM and increased pain that limits pt ability to complete ADLs.  Prognosis: good    Goals  Plan Goals: SHOULDER  PROBLEMS:     1. The patient has limited ROM of the R shoulder.    LTG 1: 12 weeks:  The patient will demonstrate 140 degrees of active shoulder flexion, 140 degrees of shoulder abduction, external rotation to behind head, and internal rotation to lumbar spine to allow the patient to reach into upper kitchen cabinets and manipulate clothing behind the back with greater ease.    STATUS:  New   STG 1a: 8 weeks:  The patient will demonstrate 140 degrees of passive shoulder flexion, 140 degrees of passive shoulder abduction, 40 degrees of passive shoulder external rotation, and 40 degrees of passive shoulder internal rotation.    STATUS:  New   TREATMENT: Manual therapy, therapeutic exercise, home exercise instruction, and modalities as needed to include: electrical stimulation, ultrasound, moist heat, and ice.    2. The patient has limited strength of the R shoulder.   LTG 2: 12 weeks:  The patient will demonstrate 5 /5 strength for R shoulder flexion, abduction, external rotation, and internal rotation in order to demonstrate improved shoulder stability.    STATUS:  New   STG 2a: 8 weeks:  The patient will demonstrate ability to tolerate MMT for R shoulder flexion, abduction, external rotation, and internal rotation.    STATUS:  New   STG2b:  8 weeks:  The patient will be independent with home exercises.     STATUS:  New   TREATMENT: Manual therapy, therapeutic exercise, home exercise instruction, and modalities as needed to include: electrical stimulation, ultrasound, moist heat, and ice.     3. The patient complains of pain to the R shoulder.   LTG 3: 12 weeks:  The patient will report a pain rating of 1 /10 at worst in order to improve sleep quality and tolerance to performance of activities of daily living.    STATUS:  New   STG  3a: 8 weeks:  The patient will report a pain rating of 3 /10 at worst.     STATUS:  New   TREATMENT: Manual therapy, therapeutic exercise, home exercise instruction, and modalities as needed to include: electrical stimulation, ultrasound, moist heat, and ice.    4. Carrying, Moving, and Handling Objects Functional Limitation     LTG 4: 12 weeks:  The patient will demonstrate 1-19 % limitation by achieving a score of 19 on the QuickDASH.    STATUS:  New   STG 4a: 8 weeks:  The patient will demonstrate 20-39 % limitation by achieving a score of 27 on the QuickDASH.      STATUS:  New   TREATMENT:  Manual therapy, therapeutic exercise, home exercise instruction, and modalities as needed to include: moist heat, electrical stimulation, and ultrasound.     Plan  Planned modality interventions: cryotherapy and thermotherapy (hydrocollator packs)  Planned therapy interventions: body mechanics training, fine motor coordination training, flexibility, functional ROM exercises, home exercise program, joint mobilization, manual therapy, neuromuscular re-education, postural training, soft tissue mobilization, strengthening, stretching and therapeutic activities  Frequency: 2x week  Duration in weeks: 12  Treatment plan discussed with: patient      Pt is indicated for skilled occupational therapy services.  Timed:          Therapeutic Exercise:    10     mins  22220;         Un-Timed:  Low Eval     20     Mins  08816    Timed Treatment:   10   mins   Total Treatment:     30   mins    Start time: 0700  End time: 0730    OT SIGNATURE: Carrillo Reddy OT   DATE TREATMENT INITIATED: 12/19/2024  [place] License: [id number]    Initial Certification  Certification Period: 3/18/2025  I certify that the therapy services are furnished while this patient is under my care.  The services outlined above are required by this patient, and will be reviewed every 90 days.     PHYSICIAN: Ramses Chery MD      DATE:   MD NPI: [id number]  Please sign and  return via fax to   [phone].. Thank you, [facility] Occupational Therapy.

## 2024-12-23 ENCOUNTER — TREATMENT (OUTPATIENT)
Dept: PHYSICAL THERAPY | Facility: CLINIC | Age: 74
End: 2024-12-23
Payer: MEDICARE

## 2024-12-23 DIAGNOSIS — Z47.89 ORTHOPEDIC AFTERCARE: Primary | ICD-10-CM

## 2024-12-23 DIAGNOSIS — M25.511 ACUTE PAIN OF RIGHT SHOULDER: ICD-10-CM

## 2024-12-23 DIAGNOSIS — Z98.890 S/P RIGHT ROTATOR CUFF REPAIR: ICD-10-CM

## 2024-12-23 DIAGNOSIS — Z98.890 STATUS POST DECOMPRESSION OF SUBACROMIAL SPACE: ICD-10-CM

## 2024-12-23 PROCEDURE — 97140 MANUAL THERAPY 1/> REGIONS: CPT | Performed by: OCCUPATIONAL THERAPIST

## 2024-12-23 PROCEDURE — 97110 THERAPEUTIC EXERCISES: CPT | Performed by: OCCUPATIONAL THERAPIST

## 2024-12-23 NOTE — PROGRESS NOTES
"Occupational Therapy Daily Treatment Note  [address], [address]   [place]  [address]      Patient: Lawrence Olsen   : 1950  Referring practitioner: Ramses Chery MD  Date of Initial Visit: Type: THERAPY  Noted: 2024  Today's Date: 2024  Patient seen for 2 sessions           Subjective Evaluation    History of Present Illness    Subjective comment: Patient states that today is the first day without sling.  Functional status:  \"I am using my arm quite a bit\"Pain  Current pain ratin  Quality: dull ache           Objective   See Exercise, Manual, and Modality Logs for complete treatment.       Assessment & Plan       Assessment  Assessment details: Good tolerance to therapeutic exercises and passive stretch.  Instructed in scapular retractions and wall slides.  Cautioned against awkward movements and heavy use.  Continue with plan of care.        Visit Diagnoses:    ICD-10-CM ICD-9-CM   1. Orthopedic aftercare  Z47.89 V54.9   2. S/P right rotator cuff repair  Z98.890 V45.89   3. Acute pain of right shoulder  M25.511 719.41   4. Status post decompression of subacromial space  Z98.890 V45.89       Progress strengthening /stabilization /functional activity           Timed:  Manual Therapy:                 10     mins  66715  Therapeutic Exercise:      10     mins  86014     Neuromuscular reeducation       0     mins 75450  Therapeutic Activity              10     mins  73986  Ultrasound:                  0     mins  23913     Untimed:  Electrical Stimulation:    0     mins  18240 ( );  Fluidotherapy      0     mins  40685    Timed Treatment:   30   mins   Total Treatment:     30   mins    Chalino Baron OT, ALANT  Occupational Therapist    Electronically signed      [place] license #: [id number]  "

## 2024-12-27 ENCOUNTER — TREATMENT (OUTPATIENT)
Dept: PHYSICAL THERAPY | Facility: CLINIC | Age: 74
End: 2024-12-27
Payer: MEDICARE

## 2024-12-27 DIAGNOSIS — Z98.890 S/P RIGHT ROTATOR CUFF REPAIR: ICD-10-CM

## 2024-12-27 DIAGNOSIS — M25.511 ACUTE PAIN OF RIGHT SHOULDER: ICD-10-CM

## 2024-12-27 DIAGNOSIS — Z47.89 ORTHOPEDIC AFTERCARE: Primary | ICD-10-CM

## 2024-12-27 NOTE — PROGRESS NOTES
Occupational Therapy Daily Treatment Note  [address]   [place]  [address]      Patient: Lawrence Olsen   : 1950  Referring practitioner: Ramses Chery MD  Date of Initial Visit: Type: THERAPY  Noted: 2024  Today's Date: 2024  Patient seen for 3 sessions           Subjective Evaluation    History of Present Illness    Subjective comment: Reports that he has no pain most of the time but has occasional sharp pain.  Functional status:  He is using his R arm for personal care and light household tasks.Pain  Current pain ratin  Pain location: occasional in R shoulder.           Objective   See Exercise, Manual, and Modality Logs for complete treatment.       Assessment & Plan       Assessment  Assessment details: Good tolerance to exercises.  Started UBE  and short arc AAROM today. Continue with plan of care.        Visit Diagnoses:    ICD-10-CM ICD-9-CM   1. Orthopedic aftercare  Z47.89 V54.9   2. S/P right rotator cuff repair  Z98.890 V45.89   3. Acute pain of right shoulder  M25.511 719.41       Progress strengthening /stabilization /functional activity           Timed:  Manual Therapy:                 10     mins  61485  Therapeutic Exercise:      10     mins  78625     Neuromuscular reeducation       0     mins 83109  Therapeutic Activity              10     mins  38135  Ultrasound:                  0     mins  27675     Untimed:  Electrical Stimulation:    0     mins  64656 ( );  Fluidotherapy      0     mins  91091    Timed Treatment:   30   mins   Total Treatment:     30   mins    Chalino Baron OT, CHT  Occupational Therapist    Electronically signed      [place] license #: [id number]

## 2025-01-02 ENCOUNTER — TREATMENT (OUTPATIENT)
Dept: PHYSICAL THERAPY | Facility: CLINIC | Age: 75
End: 2025-01-02
Payer: MEDICARE

## 2025-01-02 DIAGNOSIS — Z98.890 S/P RIGHT ROTATOR CUFF REPAIR: ICD-10-CM

## 2025-01-02 DIAGNOSIS — Z47.89 ORTHOPEDIC AFTERCARE: Primary | ICD-10-CM

## 2025-01-02 DIAGNOSIS — M25.511 ACUTE PAIN OF RIGHT SHOULDER: ICD-10-CM

## 2025-01-02 DIAGNOSIS — M25.611 DECREASED RANGE OF MOTION OF RIGHT SHOULDER: ICD-10-CM

## 2025-01-02 DIAGNOSIS — Z98.890 STATUS POST DECOMPRESSION OF SUBACROMIAL SPACE: ICD-10-CM

## 2025-01-02 NOTE — PROGRESS NOTES
Occupational Therapy Daily Treatment Note  Chanelle OT: [address] [place] [address]      Patient: Lawrence Olsen   : 1950  Diagnosis/ICD-10 Code:  Orthopedic aftercare [Z47.89]  Referring practitioner: aRmses Chery MD  Date of Initial Visit: Type: THERAPY  Noted: 2024  Today's Date: 2025  Patient seen for 4 sessions           Subjective   Lawrence Olsen reports: 5/10 pain R shoulder. Pt reports he has been simulating UBE at home. Pt reports pain comes and goes. Pt reports he is able to get his hand behind his back better and feels like his shoulder is improving.     Objective     See Exercise, Manual, and Modality Logs for complete treatment.       Assessment/Plan  OT encouraged pt to not reach behind back at this time to avoid putting too much stress on the repair. Pt verbalizes understanding. Pt also reports he has been holding his dog leash in his R hand. OT encouraged pt to not hold leash in R hand to protect repair. Good tolerance to treatment. Pt continues with decreased fxl strength/ROM and increased pain that limits ability to complete ADLs/IADLs.  Progress per Plan of Care           Timed:  Manual Therapy:    10     mins  92870;  Therapeutic Exercise:    8     mins  17916;     Therapeutic Activity:     5     mins  72926;       Timed Treatment:   23   mins   Total Treatment:     33   mins    Start time: 1130  End time: 1203    Carrillo Reddy OT  Occupational Therapist  [place] License: [id number]  NPI: [id number]

## 2025-01-04 ENCOUNTER — APPOINTMENT (OUTPATIENT)
Dept: CARDIOLOGY | Facility: HOSPITAL | Age: 75
End: 2025-01-04
Payer: MEDICARE

## 2025-01-04 ENCOUNTER — APPOINTMENT (OUTPATIENT)
Dept: CT IMAGING | Facility: HOSPITAL | Age: 75
End: 2025-01-04
Payer: MEDICARE

## 2025-01-04 ENCOUNTER — HOSPITAL ENCOUNTER (OUTPATIENT)
Facility: HOSPITAL | Age: 75
Setting detail: OBSERVATION
Discharge: HOME | End: 2025-01-04
Attending: EMERGENCY MEDICINE | Admitting: INTERNAL MEDICINE
Payer: MEDICARE

## 2025-01-04 VITALS
TEMPERATURE: 97.7 F | DIASTOLIC BLOOD PRESSURE: 75 MMHG | HEART RATE: 65 BPM | BODY MASS INDEX: 33.79 KG/M2 | WEIGHT: 202.82 LBS | HEIGHT: 65 IN | SYSTOLIC BLOOD PRESSURE: 134 MMHG | OXYGEN SATURATION: 96 % | RESPIRATION RATE: 18 BRPM

## 2025-01-04 DIAGNOSIS — G45.9 TIA (TRANSIENT ISCHEMIC ATTACK): Primary | ICD-10-CM

## 2025-01-04 LAB
ALBUMIN SERPL-MCNC: 4.3 G/DL (ref 3.5–5.2)
ALBUMIN/GLOB SERPL: 1.5 G/DL
ALP SERPL-CCNC: 66 U/L (ref 39–117)
ALT SERPL W P-5'-P-CCNC: 24 U/L (ref 1–41)
ANION GAP SERPL CALCULATED.3IONS-SCNC: 8.7 MMOL/L (ref 5–15)
AST SERPL-CCNC: 25 U/L (ref 1–40)
BASOPHILS # BLD AUTO: 0.04 10*3/MM3 (ref 0–0.2)
BASOPHILS NFR BLD AUTO: 0.6 % (ref 0–1.5)
BILIRUB SERPL-MCNC: 1.1 MG/DL (ref 0–1.2)
BUN SERPL-MCNC: 21 MG/DL (ref 8–23)
BUN/CREAT SERPL: 21.4 (ref 7–25)
CALCIUM SPEC-SCNC: 9.7 MG/DL (ref 8.6–10.5)
CHLORIDE SERPL-SCNC: 104 MMOL/L (ref 98–107)
CO2 SERPL-SCNC: 26.3 MMOL/L (ref 22–29)
CREAT SERPL-MCNC: 0.98 MG/DL (ref 0.76–1.27)
DEPRECATED RDW RBC AUTO: 39 FL (ref 37–54)
EGFRCR SERPLBLD CKD-EPI 2021: 81.9 ML/MIN/1.73
EOSINOPHIL # BLD AUTO: 0.11 10*3/MM3 (ref 0–0.4)
EOSINOPHIL NFR BLD AUTO: 1.8 % (ref 0.3–6.2)
ERYTHROCYTE [DISTWIDTH] IN BLOOD BY AUTOMATED COUNT: 13.1 % (ref 12.3–15.4)
GLOBULIN UR ELPH-MCNC: 2.8 GM/DL
GLUCOSE SERPL-MCNC: 100 MG/DL (ref 65–99)
HCT VFR BLD AUTO: 45.5 % (ref 37.5–51)
HGB BLD-MCNC: 14.8 G/DL (ref 13–17.7)
IMM GRANULOCYTES # BLD AUTO: 0.02 10*3/MM3 (ref 0–0.05)
IMM GRANULOCYTES NFR BLD AUTO: 0.3 % (ref 0–0.5)
LYMPHOCYTES # BLD AUTO: 2.13 10*3/MM3 (ref 0.7–3.1)
LYMPHOCYTES NFR BLD AUTO: 34 % (ref 19.6–45.3)
MCH RBC QN AUTO: 27.1 PG (ref 26.6–33)
MCHC RBC AUTO-ENTMCNC: 32.5 G/DL (ref 31.5–35.7)
MCV RBC AUTO: 83.3 FL (ref 79–97)
MONOCYTES # BLD AUTO: 0.65 10*3/MM3 (ref 0.1–0.9)
MONOCYTES NFR BLD AUTO: 10.4 % (ref 5–12)
NEUTROPHILS NFR BLD AUTO: 3.31 10*3/MM3 (ref 1.7–7)
NEUTROPHILS NFR BLD AUTO: 52.9 % (ref 42.7–76)
NRBC BLD AUTO-RTO: 0 /100 WBC (ref 0–0.2)
PLATELET # BLD AUTO: 199 10*3/MM3 (ref 140–450)
PMV BLD AUTO: 9.9 FL (ref 6–12)
POTASSIUM SERPL-SCNC: 4.3 MMOL/L (ref 3.5–5.2)
PROT SERPL-MCNC: 7.1 G/DL (ref 6–8.5)
QT INTERVAL: 469 MS
QTC INTERVAL: 469 MS
RBC # BLD AUTO: 5.46 10*6/MM3 (ref 4.14–5.8)
SODIUM SERPL-SCNC: 139 MMOL/L (ref 136–145)
WBC NRBC COR # BLD AUTO: 6.26 10*3/MM3 (ref 3.4–10.8)

## 2025-01-04 PROCEDURE — G0378 HOSPITAL OBSERVATION PER HR: HCPCS

## 2025-01-04 PROCEDURE — 70498 CT ANGIOGRAPHY NECK: CPT

## 2025-01-04 PROCEDURE — 93306 TTE W/DOPPLER COMPLETE: CPT

## 2025-01-04 PROCEDURE — A9270 NON-COVERED ITEM OR SERVICE: HCPCS | Performed by: INTERNAL MEDICINE

## 2025-01-04 PROCEDURE — 63710000001 FAMOTIDINE 20 MG TABLET: Performed by: INTERNAL MEDICINE

## 2025-01-04 PROCEDURE — 25510000001 IOPAMIDOL PER 1 ML: Performed by: INTERNAL MEDICINE

## 2025-01-04 PROCEDURE — 93005 ELECTROCARDIOGRAM TRACING: CPT | Performed by: EMERGENCY MEDICINE

## 2025-01-04 PROCEDURE — 70450 CT HEAD/BRAIN W/O DYE: CPT

## 2025-01-04 PROCEDURE — 63710000001 ACETAMINOPHEN 325 MG TABLET: Performed by: INTERNAL MEDICINE

## 2025-01-04 PROCEDURE — 93005 ELECTROCARDIOGRAM TRACING: CPT

## 2025-01-04 PROCEDURE — 85025 COMPLETE CBC W/AUTO DIFF WBC: CPT

## 2025-01-04 PROCEDURE — 63710000001 ASPIRIN 81 MG TABLET DELAYED-RELEASE: Performed by: INTERNAL MEDICINE

## 2025-01-04 PROCEDURE — 36415 COLL VENOUS BLD VENIPUNCTURE: CPT

## 2025-01-04 PROCEDURE — 99285 EMERGENCY DEPT VISIT HI MDM: CPT

## 2025-01-04 PROCEDURE — 80053 COMPREHEN METABOLIC PANEL: CPT

## 2025-01-04 RX ORDER — ASPIRIN 81 MG/1
81 TABLET ORAL DAILY
Qty: 30 TABLET | Refills: 0 | Status: SHIPPED | OUTPATIENT
Start: 2025-01-04 | End: 2025-02-03

## 2025-01-04 RX ORDER — BISACODYL 10 MG
10 SUPPOSITORY, RECTAL RECTAL DAILY PRN
Status: DISCONTINUED | OUTPATIENT
Start: 2025-01-04 | End: 2025-01-04 | Stop reason: HOSPADM

## 2025-01-04 RX ORDER — AMOXICILLIN 250 MG
2 CAPSULE ORAL 2 TIMES DAILY PRN
Status: DISCONTINUED | OUTPATIENT
Start: 2025-01-04 | End: 2025-01-04 | Stop reason: HOSPADM

## 2025-01-04 RX ORDER — ASPIRIN 81 MG/1
81 TABLET ORAL DAILY
Status: DISCONTINUED | OUTPATIENT
Start: 2025-01-04 | End: 2025-01-04 | Stop reason: HOSPADM

## 2025-01-04 RX ORDER — ATORVASTATIN CALCIUM 20 MG/1
20 TABLET, FILM COATED ORAL NIGHTLY
Status: DISCONTINUED | OUTPATIENT
Start: 2025-01-04 | End: 2025-01-04 | Stop reason: HOSPADM

## 2025-01-04 RX ORDER — ALPRAZOLAM 0.25 MG/1
0.5 TABLET ORAL EVERY 8 HOURS PRN
Status: DISCONTINUED | OUTPATIENT
Start: 2025-01-04 | End: 2025-01-04 | Stop reason: HOSPADM

## 2025-01-04 RX ORDER — POLYETHYLENE GLYCOL 3350 17 G/17G
17 POWDER, FOR SOLUTION ORAL DAILY PRN
Status: DISCONTINUED | OUTPATIENT
Start: 2025-01-04 | End: 2025-01-04 | Stop reason: HOSPADM

## 2025-01-04 RX ORDER — ATORVASTATIN CALCIUM 20 MG/1
20 TABLET, FILM COATED ORAL NIGHTLY
Qty: 30 TABLET | Refills: 0 | Status: SHIPPED | OUTPATIENT
Start: 2025-01-04 | End: 2025-02-03

## 2025-01-04 RX ORDER — ACETAMINOPHEN 160 MG/5ML
650 SOLUTION ORAL EVERY 4 HOURS PRN
Status: DISCONTINUED | OUTPATIENT
Start: 2025-01-04 | End: 2025-01-04 | Stop reason: HOSPADM

## 2025-01-04 RX ORDER — FAMOTIDINE 20 MG/1
40 TABLET, FILM COATED ORAL DAILY
Status: DISCONTINUED | OUTPATIENT
Start: 2025-01-04 | End: 2025-01-04 | Stop reason: HOSPADM

## 2025-01-04 RX ORDER — BISACODYL 5 MG/1
5 TABLET, DELAYED RELEASE ORAL DAILY PRN
Status: DISCONTINUED | OUTPATIENT
Start: 2025-01-04 | End: 2025-01-04 | Stop reason: HOSPADM

## 2025-01-04 RX ORDER — ACETAMINOPHEN 650 MG/1
650 SUPPOSITORY RECTAL EVERY 4 HOURS PRN
Status: DISCONTINUED | OUTPATIENT
Start: 2025-01-04 | End: 2025-01-04 | Stop reason: HOSPADM

## 2025-01-04 RX ORDER — ACETAMINOPHEN 325 MG/1
650 TABLET ORAL EVERY 4 HOURS PRN
Status: DISCONTINUED | OUTPATIENT
Start: 2025-01-04 | End: 2025-01-04 | Stop reason: HOSPADM

## 2025-01-04 RX ORDER — ONDANSETRON 2 MG/ML
4 INJECTION INTRAMUSCULAR; INTRAVENOUS EVERY 6 HOURS PRN
Status: DISCONTINUED | OUTPATIENT
Start: 2025-01-04 | End: 2025-01-04 | Stop reason: HOSPADM

## 2025-01-04 RX ORDER — ALUMINA, MAGNESIA, AND SIMETHICONE 2400; 2400; 240 MG/30ML; MG/30ML; MG/30ML
15 SUSPENSION ORAL EVERY 6 HOURS PRN
Status: DISCONTINUED | OUTPATIENT
Start: 2025-01-04 | End: 2025-01-04 | Stop reason: HOSPADM

## 2025-01-04 RX ORDER — IOPAMIDOL 755 MG/ML
100 INJECTION, SOLUTION INTRAVASCULAR
Status: COMPLETED | OUTPATIENT
Start: 2025-01-04 | End: 2025-01-04

## 2025-01-04 RX ADMIN — IOPAMIDOL 100 ML: 755 INJECTION, SOLUTION INTRAVENOUS at 10:54

## 2025-01-04 RX ADMIN — ASPIRIN 81 MG: 81 TABLET, COATED ORAL at 10:55

## 2025-01-04 RX ADMIN — ACETAMINOPHEN 650 MG: 325 TABLET ORAL at 06:25

## 2025-01-04 RX ADMIN — FAMOTIDINE 40 MG: 20 TABLET, FILM COATED ORAL at 08:26

## 2025-01-04 NOTE — ED PROVIDER NOTES
Time: 5:51 AM EST  Date of encounter:  1/4/2025  Independent Historian/Clinical History and Information was obtained by:   Patient    History is limited by: N/A    Chief Complaint: numbness, headache      History of Present Illness:  Patient is a 74 y.o. year old male who presents to the emergency department for evaluation of Left-sided tingling and numbness.  Patient states also associated headache.  This started around 11 PM.  The numbness has since resolved.  He denies any weakness.  Still reports mild headache.  No other complaints.      Patient Care Team  Primary Care Provider: Devon Kaplan MD    Past Medical History:     No Known Allergies  Past Medical History:   Diagnosis Date    Ankle sprain Unknown    Arthritis     Arthritis of back 2020    Bladder disorder     BPH with urinary obstruction     Cancer     SKIN    Chest pain     Colon polyp     Elevated PSA 07/21/2017    HBP (high blood pressure)     High cholesterol     Hip arthrosis 2017    Knee swelling 2020    Limb pain     Limb swelling     Low back strain ?    Prostate disorder     Tear of meniscus of knee 12/23     Past Surgical History:   Procedure Laterality Date    CHOLECYSTECTOMY      COLONOSCOPY      COLONOSCOPY N/A 09/09/2024    Procedure: COLONOSCOPY WITH POLYPECTOMIES;  Surgeon: José Miguel Mckay MD;  Location: [facility] ENDOSCOPY;  Service: General;  Laterality: N/A;  DIVERTICULOSIS, COLON POLYPS    GALLBLADDER SURGERY      HERNIA REPAIR Bilateral     SHOULDER ARTHROSCOPY W/ ROTATOR CUFF REPAIR Right      Family History   Problem Relation Age of Onset    Kidney nephrosis Mother     Cancer Father     Heart attack Father     Colon cancer Father 50    Colon cancer Sister 50    Cancer Brother     Cancer Sister        Home Medications:  Prior to Admission medications    Medication Sig Start Date End Date Taking? Authorizing Provider   acetaminophen (TYLENOL) 500 MG tablet Take 1 tablet by mouth Every 6 (Six) Hours As Needed for Mild Pain.     "ProviderChris MD   amLODIPine-benazepril (LOTREL) 10-20 MG per capsule Lotrel 10-20 mg oral capsule take 1 capsule by oral route once daily   Active    ProviderChris MD   cyclobenzaprine (FLEXERIL) 10 MG tablet Take 1 tablet 3 times a day by oral route as needed for 30 days. 3/8/24   Chris Castillo MD   finasteride (PROSCAR) 5 MG tablet 1 tablet daily 24  Raffy Samuels MD   tamsulosin (FLOMAX) 0.4 MG capsule 24 hr capsule 1 tablet PCHS daily 24  Raffy Samuels MD        Social History:   Social History     Tobacco Use    Smoking status: Former     Current packs/day: 0.00     Average packs/day: 0.3 packs/day for 1 year (0.3 ttl pk-yrs)     Types: Cigarettes     Start date: 10/10/1970     Quit date: 10/10/1971     Years since quittin.2     Passive exposure: Past    Smokeless tobacco: Never    Tobacco comments:     Teen   Vaping Use    Vaping status: Never Used   Substance Use Topics    Alcohol use: Yes     Alcohol/week: 2.0 standard drinks of alcohol     Types: 2 Cans of beer per week     Comment: Occasional    Drug use: Never         Review of Systems:  Review of Systems   Constitutional:  Negative for chills and fever.   HENT:  Negative for congestion, ear pain and sore throat.    Eyes:  Negative for pain.   Respiratory:  Negative for cough, chest tightness and shortness of breath.    Cardiovascular:  Negative for chest pain.   Gastrointestinal:  Negative for abdominal pain, diarrhea, nausea and vomiting.   Genitourinary:  Negative for flank pain and hematuria.   Musculoskeletal:  Negative for joint swelling.   Skin:  Negative for pallor.   Neurological:  Positive for numbness and headaches. Negative for seizures.   All other systems reviewed and are negative.       Physical Exam:  /73 (BP Location: Right arm, Patient Position: Lying)   Pulse 65   Temp 98 °F (36.7 °C) (Oral)   Resp 18   Ht 165.1 cm (65\")   Wt 91.9 kg (202 lb 9.6 oz)   " SpO2 95%   BMI 33.71 kg/m²     Physical Exam  Constitutional:       Appearance: Normal appearance.   HENT:      Head: Normocephalic and atraumatic.      Nose: Nose normal.      Mouth/Throat:      Mouth: Mucous membranes are moist.   Eyes:      Extraocular Movements: Extraocular movements intact.      Conjunctiva/sclera: Conjunctivae normal.      Pupils: Pupils are equal, round, and reactive to light.   Cardiovascular:      Rate and Rhythm: Normal rate and regular rhythm.      Pulses: Normal pulses.      Heart sounds: Normal heart sounds.   Pulmonary:      Effort: Pulmonary effort is normal.      Breath sounds: Normal breath sounds.   Abdominal:      General: There is no distension.      Palpations: Abdomen is soft.      Tenderness: There is no abdominal tenderness.   Musculoskeletal:         General: Normal range of motion.      Cervical back: Normal range of motion.   Skin:     General: Skin is warm and dry.      Capillary Refill: Capillary refill takes less than 2 seconds.   Neurological:      General: No focal deficit present.      Mental Status: He is alert and oriented to person, place, and time. Mental status is at baseline.   Psychiatric:         Mood and Affect: Mood normal.         Behavior: Behavior normal.                    Medical Decision Making:      Comorbidities that affect care:    Cancer, Hypertension    External Notes reviewed:    Previous Clinic Note: Patient was seen by urology on 12/5/2024 for BPH with obstruction/lower tract symptoms.      The following orders were placed and all results were independently analyzed by me:  Orders Placed This Encounter   Procedures    CT Head Without Contrast    Comprehensive Metabolic Panel    CBC Auto Differential    Diet: Regular/House; Fluid Consistency: Thin (IDDSI 0)    Vital Signs    Up in Chair    Activity (Specify Details)    Turn Patient    Up With Assistance    Weigh Patient    Daily Weights    Strict Intake & Output    Oral Care    Code Status and  Medical Interventions: CPR (Attempt to Resuscitate); Full Support    Inpatient Hospitalist Consult    Pulse Oximetry,  Spot    ECG 12 Lead Stroke Evaluation    Initiate Observation Status    CBC & Differential       Medications Given in the Emergency Department:  Medications   acetaminophen (TYLENOL) tablet 650 mg (has no administration in time range)     Or   acetaminophen (TYLENOL) 160 MG/5ML oral solution 650 mg (has no administration in time range)     Or   acetaminophen (TYLENOL) suppository 650 mg (has no administration in time range)   aluminum-magnesium hydroxide-simethicone (MAALOX MAX) 400-400-40 MG/5ML suspension 15 mL (has no administration in time range)   famotidine (PEPCID) tablet 40 mg (has no administration in time range)   ALPRAZolam (XANAX) tablet 0.5 mg (has no administration in time range)   sennosides-docusate (PERICOLACE) 8.6-50 MG per tablet 2 tablet (has no administration in time range)     And   polyethylene glycol (MIRALAX) packet 17 g (has no administration in time range)     And   bisacodyl (DULCOLAX) EC tablet 5 mg (has no administration in time range)     And   bisacodyl (DULCOLAX) suppository 10 mg (has no administration in time range)   ondansetron (ZOFRAN) injection 4 mg (has no administration in time range)        ED Course:    ED Course as of 01/04/25 0609   Sat Jan 04, 2025   0609 ECG 12 Lead Stroke Evaluation  Sinus rhythm rate of 60.  Right bundle branch block.  Normal IA and QTc.  EKG interpreted by me   [LD]      ED Course User Index  [LD] Lolis Tavarez MD       Labs:    Lab Results (last 24 hours)       Procedure Component Value Units Date/Time    CBC & Differential [[id number]]  (Normal) Collected: 01/04/25 0233    Specimen: Blood Updated: 01/04/25 0241    Narrative:      The following orders were created for panel order CBC & Differential.  Procedure                               Abnormality         Status                     ---------                                -----------         ------                     CBC Auto Differential[[id number]]        Normal              Final result                 Please view results for these tests on the individual orders.    Comprehensive Metabolic Panel [[id number]]  (Abnormal) Collected: 01/04/25 0233    Specimen: Blood Updated: 01/04/25 0304     Glucose 100 mg/dL      BUN 21 mg/dL      Creatinine 0.98 mg/dL      Sodium 139 mmol/L      Potassium 4.3 mmol/L      Comment: Slight hemolysis detected by analyzer. Result may be falsely elevated.        Chloride 104 mmol/L      CO2 26.3 mmol/L      Calcium 9.7 mg/dL      Total Protein 7.1 g/dL      Albumin 4.3 g/dL      ALT (SGPT) 24 U/L      AST (SGOT) 25 U/L      Comment: Slight hemolysis detected by analyzer. Result may be falsely elevated.        Alkaline Phosphatase 66 U/L      Total Bilirubin 1.1 mg/dL      Globulin 2.8 gm/dL      A/G Ratio 1.5 g/dL      BUN/Creatinine Ratio 21.4     Anion Gap 8.7 mmol/L      eGFR 81.9 mL/min/1.73     Narrative:      GFR Categories in Chronic Kidney Disease (CKD)      GFR Category          GFR (mL/min/1.73)    Interpretation  G1                     90 or greater         Normal or high (1)  G2                      60-89                Mild decrease (1)  G3a                   45-59                Mild to moderate decrease  G3b                   30-44                Moderate to severe decrease  G4                    15-29                Severe decrease  G5                    14 or less           Kidney failure          (1)In the absence of evidence of kidney disease, neither GFR category G1 or G2 fulfill the criteria for CKD.    eGFR calculation 2021 CKD-EPI creatinine equation, which does not include race as a factor    CBC Auto Differential [[id number]]  (Normal) Collected: 01/04/25 0233    Specimen: Blood Updated: 01/04/25 0241     WBC 6.26 10*3/mm3      RBC 5.46 10*6/mm3      Hemoglobin 14.8 g/dL      Hematocrit 45.5 %      MCV 83.3 fL      MCH 27.1 pg       MCHC 32.5 g/dL      RDW 13.1 %      RDW-SD 39.0 fl      MPV 9.9 fL      Platelets 199 10*3/mm3      Neutrophil % 52.9 %      Lymphocyte % 34.0 %      Monocyte % 10.4 %      Eosinophil % 1.8 %      Basophil % 0.6 %      Immature Grans % 0.3 %      Neutrophils, Absolute 3.31 10*3/mm3      Lymphocytes, Absolute 2.13 10*3/mm3      Monocytes, Absolute 0.65 10*3/mm3      Eosinophils, Absolute 0.11 10*3/mm3      Basophils, Absolute 0.04 10*3/mm3      Immature Grans, Absolute 0.02 10*3/mm3      nRBC 0.0 /100 WBC              Imaging:    CT Head Without Contrast    Result Date: 1/4/2025  CT HEAD WO CONTRAST HISTORY: Left side tingling and headache. TECHNIQUE: Axial unenhanced head CT with multiplanar reformats. Radiation dose reduction techniques included automated exposure control or exposure modulation based on body size. COMPARISON: None. FINDINGS: Ventricular size and configuration are normal. No acute infarct or hemorrhage is identified. There are no masses. There is no skull fracture. There is generalized age-appropriate atrophy. Visualized paranasal sinuses and mastoid air cells are clear.     Age-appropriate atrophy. No acute findings. Electronically Signed: Sergey Brantley MD  1/4/2025 4:12 AM EST  Workstation ID: [id number]       Differential Diagnosis and Discussion:    Stroke: Differential diagnosis in this patient with signs of possible ischemic stroke include TIA or ischemic stroke, hemorrhagic stroke, hypoglycemic episode, toxic or metabolic encephalopathy, paresthesias.    PROCEDURES:    Labs were collected in the emergency department and all labs were reviewed and interpreted by me.  X-ray were performed in the emergency department and all X-ray impressions were independently interpreted by me.  An EKG was performed and the EKG was interpreted by me.  CT scan was performed in the emergency department and the CT scan radiology impression was interpreted by me.    ECG 12 Lead Stroke Evaluation   Preliminary  Result   HEART RATE=60  bpm   RR Weyojfja=5662  ms   RI Ntrlnpes=076  ms   P Horizontal Axis=33  deg   P Front Axis=-9  deg   QRSD Jsgvuaxu=318  ms   QT Kbjgukgm=796  ms   DOnW=583  ms   QRS Axis=-45  deg   T Wave Axis=38  deg   - ABNORMAL ECG -   Sinus rhythm   RBBB and LAFB   Probable left ventricular hypertrophy   Lateral infarct, age indeterminate   Date and Time of Study:2025-01-04 03:17:39          Procedures    MDM  Number of Diagnoses or Management Options  Diagnosis management comments: Patient presents to the emergency department for evaluation for numbness to left upper lower extremities and headache.  CT was obtained that did not show acute finding.  Time valuation numbness had resolved.  He does not have any focal deficits on exam at this time.  Labs show no other significant abnormality.  Patient was evaluated by teleneurology recommended admission for stroke workup.  Discussed patient with hospitalist and will admit for further care.       Amount and/or Complexity of Data Reviewed  Clinical lab tests: reviewed  Tests in the radiology section of CPT®: reviewed  Review and summarize past medical records: yes  Independent visualization of images, tracings, or specimens: yes    Risk of Complications, Morbidity, and/or Mortality  Presenting problems: moderate  Management options: moderate                       Patient Care Considerations:    SEPSIS was considered but is NOT present in the emergency department as SIRS criteria is not present.      Consultants/Shared Management Plan:    Hospitalist: I have discussed the case with Dr Floyd who agrees to accept the patient for admission.  Consultant: I have discussed the case with Teleneurology who agrees to consult on the patient.    Social Determinants of Health:    Patient is independent, reliable, and has access to care.       Disposition and Care Coordination:    Admit:   Through independent evaluation of the patient's history, physical, and imperical  data, the patient meets criteria for inpatient admission to the hospital.        Final diagnoses:   TIA (transient ischemic attack)        ED Disposition       ED Disposition   Decision to Admit    Condition   --    Comment   Level of Care: Telemetry [5]  Diagnosis: TIA (transient ischemic attack) [070330]  Admitting Physician: BARRY RAMOS [[id number]]  Attending Physician: BARRY RAMOS [[id number]]                 This medical record created using voice recognition software.             Lolis Tavarez MD  01/04/25 0610

## 2025-01-04 NOTE — DISCHARGE SUMMARY
[facility]   DISCHARGE SUMMARY    Patient Name: Lawrence Olsen  : 1950  MRN: [id number]    Date of Admission: 2025  Date of Discharge: 2025  Primary Care Physician: Devon Kaplan MD    Consults       Date and Time Order Name Status Description    2025  5:06 AM Inpatient Hospitalist Consult              Hospital Course     Presenting Problem:   TIA (transient ischemic attack) [G45.9]    Active and resolved problems  Principal Problem:    TIA (transient ischemic attack)  Overview:  Resolved Problems:    * No resolved hospital problems. *      Hospital Course:  Lawrence Olsen is a 74 y.o. male see H&P      Vital Signs:  Temp:  [97.5 °F (36.4 °C)-98.1 °F (36.7 °C)] 97.9 °F (36.6 °C)  Heart Rate:  [56-68] 56  Resp:  [16-18] 16  BP: (112-152)/(69-84) 126/84      Discharge Details        Discharge Medications        New Medications        Instructions Start Date   aspirin 81 MG EC tablet   81 mg, Oral, Daily      atorvastatin 20 MG tablet  Commonly known as: LIPITOR   20 mg, Oral, Nightly             Changes to Medications        Instructions Start Date   finasteride 5 MG tablet  Commonly known as: PROSCAR  What changed:   how much to take  how to take this  when to take this   1 tablet daily      tamsulosin 0.4 MG capsule 24 hr capsule  Commonly known as: FLOMAX  What changed:   how much to take  how to take this  when to take this  additional instructions   1 tablet PCHS daily             Continue These Medications        Instructions Start Date   acetaminophen 500 MG tablet  Commonly known as: TYLENOL   500 mg, Every 6 Hours PRN      amLODIPine-benazepril 10-20 MG per capsule  Commonly known as: LOTREL   Take 1 capsule by mouth Daily.      cyclobenzaprine 10 MG tablet  Commonly known as: FLEXERIL   Take 1 tablet by mouth As Needed for Muscle Spasms.               No Known Allergies      Discharge Disposition:  Home or Self Care    Diet:        Discharge Activity:         CODE STATUS:     Code Status and Medical Interventions: CPR (Attempt to Resuscitate); Full Support   Ordered at: 01/04/25 0514     Code Status (Patient has no pulse and is not breathing):    CPR (Attempt to Resuscitate)     Medical Interventions (Patient has pulse or is breathing):    Full Support         Future Appointments   Date Time Provider Department Center   1/8/2025  8:30 AM Carrillo Reddy, OT [facility]   1/10/2025  8:00 AM Carrillo Reddy, OT [facility]   1/13/2025  8:00 AM Carrillo Reddy, OT [facility]   1/16/2025  8:00 AM Carrillo Reddy, OT [facility]   1/20/2025  8:00 AM Carrillo Reddy, OT [facility]   1/23/2025  8:00 AM Carrillo Reddy, OT [facility]   1/27/2025  8:00 AM Carrillo Reddy, OT [facility]   1/30/2025  8:00 AM Carrillo Reddy, OT [facility]   2/3/2025  8:00 AM Carrillo Reddy, OT [facility]   2/6/2025  8:00 AM Carrillo Reddy, OT [facility]   2/10/2025  8:00 AM Carrillo Reddy, OT [facility]   2/13/2025  8:00 AM Carrillo Reddy, OT [facility]   2/17/2025  8:00 AM Carrillo Reddy, OT [facility]   2/20/2025  8:00 AM Carrillo Reddy, OT [facility]   2/24/2025  8:00 AM Carrillo Reddy, OT [facility]   2/27/2025  8:00 AM Carrillo Reddy, OT [facility]   3/3/2025  8:00 AM Carrillo Reddy, OT [facility]   3/6/2025  8:00 AM Carrillo Reddy, OT [facility]   3/10/2025  8:00 AM Carrillo Reddy, OT [facility]   3/13/2025  8:00 AM Carrillo Reddy, OT [facility]   3/17/2025  8:00 AM aCrrillo Reddy, OT [facility]   3/20/2025  8:00 AM Carrillo Reddy, OT [facility]   6/20/2025  1:30 PM Jesús Santiago MD [facility]           Time spent on Discharge including face to face service:   minutes    Electronically signed by Isrrael Floyd MD, 01/04/25, 9:13 AM EST.

## 2025-01-04 NOTE — Clinical Note
Level of Care: Telemetry [5]   Diagnosis: TIA (transient ischemic attack) [545712]   Admitting Physician: BARRY RAMOS [[id number]]   Attending Physician: BARRY RAMOS [[id number]]

## 2025-01-04 NOTE — H&P
[facility]   HISTORY AND PHYSICAL    Patient Name: Lawrence Olsen  : 1950  MRN: [id number]  Primary Care Physician:  Devon Kaplan MD  Date of admission: 2025    Subjective   Subjective     Chief Complaint: Left-sided numbness    HPI:    Lawrence Olsen is a 74 y.o. male with past medical history significant for hypertension BPH osteoarthritis started having a numbness in his left arm and then it extended on the left leg lasting close to 2 hours but he never felt weak slurring of speech or any other neurological deficit.  Patient came to the emergency room and then his numbness completely got resolved.  I was requested to observe the patient and complete the workup.  When I see the patient he is fully awake alert and he is completely asymptomatic.  No radiologic studies were done in the emergency room  Review of Systems  All review of systems negative except as in subjective complaints:  Personal History     Past Medical History:   Diagnosis Date    Ankle sprain Unknown    Arthritis     Arthritis of back     Bladder disorder     BPH with urinary obstruction     Cancer     SKIN    Chest pain     Colon polyp     Elevated PSA 2017    HBP (high blood pressure)     High cholesterol     Hip arthrosis     Knee swelling     Limb pain     Limb swelling     Low back strain ?    Prostate disorder     Tear of meniscus of knee        Past Surgical History:   Procedure Laterality Date    CHOLECYSTECTOMY      COLONOSCOPY      COLONOSCOPY N/A 2024    Procedure: COLONOSCOPY WITH POLYPECTOMIES;  Surgeon: José Miguel Mckay MD;  Location: [facility] ENDOSCOPY;  Service: General;  Laterality: N/A;  DIVERTICULOSIS, COLON POLYPS    GALLBLADDER SURGERY      HERNIA REPAIR Bilateral     SHOULDER ARTHROSCOPY W/ ROTATOR CUFF REPAIR Right        Family History: family history includes Cancer in his brother, father, and sister; Colon cancer (age of onset: 50) in his father and sister; Heart attack  in his father; Kidney nephrosis in his mother. Otherwise pertinent FHx was reviewed and not pertinent to current issue.    Social History:  reports that he quit smoking about 53 years ago. His smoking use included cigarettes. He started smoking about 54 years ago. He has a 0.3 pack-year smoking history. He has been exposed to tobacco smoke. He has never used smokeless tobacco. He reports current alcohol use of about 2.0 standard drinks of alcohol per week. He reports that he does not use drugs.    Home Medications:  acetaminophen, amLODIPine-benazepril, cyclobenzaprine, finasteride, and tamsulosin      Allergies:  No Known Allergies    Objective   Objective     Vitals:   Temp:  [97.5 °F (36.4 °C)-98.1 °F (36.7 °C)] 98.1 °F (36.7 °C)  Heart Rate:  [61-68] 68  Resp:  [16-18] 16  BP: (112-152)/(69-75) 131/75  Physical Exam               Constitutional:         Awake, alert responsive, conversant, no obvious distress   Eyes:                       PERRLA, sclerae anicteric, no conjunctival injection   HEENT:                   Moist mucous membranes, no nasal or eye discharge, no throat congestion   Neck:                      Supple, no thyromegaly, no lymphadenopathy, trachea midline, no elevated JVD   Respiratory:           Clear to auscultation bilaterally, nonlabored respirations    Cardiovascular:     RRR, no murmurs, rubs, or gallops, palpable pedal pulses bilaterally,No bilateral ankle edema   Gastrointestinal:   Positive bowel sounds, soft, nontender, nondistended, no organomegaly   Musculoskeletal:   No clubbing or cyanosis to extremities, muscle wasting, joint swelling, muscle weakness   Psychiatric:             Appropriate affect, cooperative   Neurologic:            Awake alert ,oriented x 3, strength symmetric in all extremities, Cranial Nerves grossly intact to confrontation, speech clear   Skin:                      No rashes, bruising, skin ulcers, petechiae or ecchymosis    Result Review    Result  Review:  I have personally reviewed the results from the time of this admission to 1/4/2025 09:04 EST and agree with these findings:  []  Laboratory  []  Microbiology  []  Radiology  []  EKG/Telemetry   []  Cardiology/Vascular   []  Pathology  []  Old records  []  Other:    Results from last 7 days   Lab Units 01/04/25  0233   WBC 10*3/mm3 6.26   HEMOGLOBIN g/dL 14.8   PLATELETS 10*3/mm3 199     Results from last 7 days   Lab Units 01/04/25  0233   SODIUM mmol/L 139   POTASSIUM mmol/L 4.3   CHLORIDE mmol/L 104   CO2 mmol/L 26.3   ANION GAP mmol/L 8.7   BUN mg/dL 21   CREATININE mg/dL 0.98   GLUCOSE mg/dL 100*         Assessment & Plan   Assessment / Plan     Active Hospital Problems:  Active Hospital Problems    Diagnosis     **TIA (transient ischemic attack)        Plan:   2D echo  Lipid profile  Outpatient aspirin  CT angiogram of the neck  After the workup patient will be discharged and follow-up with the PCP as outpatient    VTE Prophylaxis:  No VTE prophylaxis order currently exists.        CODE STATUS:    Code Status (Patient has no pulse and is not breathing): CPR (Attempt to Resuscitate)  Medical Interventions (Patient has pulse or is breathing): Full Support    Admission Status:  I believe this patient meets observation status.    Electronically signed by Isrrael Floyd MD, 01/04/25, 9:04 AM EST.

## 2025-01-04 NOTE — PLAN OF CARE
"Goal Outcome Evaluation:  Plan of Care Reviewed With: patient        Progress: improving        Pt arrived to room 208-1 from the ER with diagnosis of TIA. Pt reports that he had some left sided \"tingling\" and left sided headache which is unusual for him. Tingling is resolved and NIH 0 on arrival as well as in ER. PT reports he is still having a \"nagging\" headache but that it is improved from before. Alert and oriented x 4. Lungs clear and  is on room air. Passed dysphagia screen. Up ad kaur.                         "

## 2025-01-04 NOTE — CONSULTS
[facility]  [facility]    Stat Consult    Patient Name:   Lawrence Olsen  YOB: 1950  Identification Number:   MRN - [id number]  Date of Service:   01/04/2025 04:06:51    Diagnosis:        G45.9 - Transient cerebral ischemic attack, unspecified    Impression  74M with minimal PMHx presents with LUE and LLE tingling and numbness and L sided headache that started yesterday at 2300, numbness now resolved. Patient tells me he felt well when he went to be at 2330, did not fall asleep, shortly thereafter his left arm and leg became tingly, and couldn't get comfortable due to this. HE got up and walked around. His BP was a little high for him 153/72. He has never had these symptoms now before, lasted about 3 hours. He doesn't often get headaches, has one today. LEft side was not weak, no change in gait, no speech changes.  No deficits on exam. Episode concerning for TIA vs acute ischemic stroke.      Recommendations:  Our recommendations are outlined below.    Diagnostic Studies :  MRI head without contrast  CTA head and neck with contrast  TTE    Laboratory Studies :  Lipid panel  I ordered  Hemoglobin A1c  TSH    Antithrombotic Medication :  ASA 325mg PO and Plavix 300mg PO; followed ASA 81mg and Plavix 75mg daily for 20 days, then ASA 81mg daily only thereafter, unless there is a contraindication Up to 180/100    Nursing Recommendations :  IV Fluids, avoid dextrose containing fluids, Maintain euglycemia  Head of bed 30 degrees  Continue with Telemetry  Neuro checks Q4 hours    Consultations :  Recommend Speech therapy if failed dysphagia screen  Physical therapy/Occupational therapy    DVT Prophylaxis :  Lovenox or LMW Heparin    Disposition :  Neurology will follow        ----------------------------------------------------------------------------------------------------        Metrics:  Dispatch Time: 01/04/2025 04:04:39  Callback Response Time: 01/04/2025  04:08:06    Primary Provider Notified of Diagnostic Impression and Management Plan on: 01/04/2025 04:47:41      CT HEAD:  As Per Radiologist CT Head Showed No Acute Hemorrhage or Acute Core Infarct        ----------------------------------------------------------------------------------------------------    Chief Complaint:  Pt had numbness and tingling to L arm and leg, also headache on L side. started at 2300 still has mild headache and numbness is gone. lkwt: 2300 imaging    History of Present Illness:  Patient is a 74 year old Male.  74M with minimal PMHx presents with LUE and LLE tingling and numbness and L sided headache that started yesterday at 2300, numbness now resolved. Patient tells me he felt well when he went to be at 2330, did not fall asleep, shortly thereafter his left arm and leg became tingly, and couldn't get comfortable due to this. HE got up and walked around. His BP was a little high for him 153/72. He has never had these symptoms now before, lasted about 3 hours. He doesn't often get headaches, has one today. LEft side was not weak, no change in gait, no speech changes.       Past Medical History:       There is no history of Stroke       There is no history of Seizures    Medications:    No Anticoagulant use   No Antiplatelet use  Reviewed EMR for current medications    Allergies:   Reviewed    Social History:  Smoking: No    Family History:    There is no family history of premature cerebrovascular disease pertinent to this consultation    ROS :  14 Points Review of Systems was performed and was negative except mentioned in HPI.    Past Surgical History:  There Is No Surgical History Contributory To Today’s Visit       Examination:  BP(112/71), Pulse(62),  1A: Level of Consciousness - Alert; keenly responsive + 0  1B: Ask Month and Age - Both Questions Right + 0  1C: Blink Eyes & Squeeze Hands - Performs Both Tasks + 0  2: Test Horizontal Extraocular Movements - Normal + 0  3: Test Visual  Fields - No Visual Loss + 0  4: Test Facial Palsy (Use Grimace if Obtunded) - Normal symmetry + 0  5A: Test Left Arm Motor Drift - No Drift for 10 Seconds + 0  5B: Test Right Arm Motor Drift - No Drift for 10 Seconds + 0  6A: Test Left Leg Motor Drift - No Drift for 5 Seconds + 0  6B: Test Right Leg Motor Drift - No Drift for 5 Seconds + 0  7: Test Limb Ataxia (FNF/Heel-Shin) - No Ataxia + 0  8: Test Sensation - Normal; No sensory loss + 0  9: Test Language/Aphasia - Normal; No aphasia + 0  10: Test Dysarthria - Normal + 0  11: Test Extinction/Inattention - No abnormality + 0    NIHSS Score: 0    Spoke with : ED physician        This consult was conducted in real time using interactive audio and video technology. Patient was informed of the technology being used for this visit and agreed to proceed. Patient located in hospital and provider located at home/office setting.    Patient is being evaluated for possible acute neurologic impairment and high probability of imminent or life - threatening deterioration.I spent total of 35 minutes providing care to this patient, including time for face to face visit via telemedicine, review of medical records, imaging studies and discussion of findings with providers, the patient and / or family.      Dr Tova Liang      [facility]  For Inpatient follow-up with [facility] physician please call [facility] at [phone]. As we are not an outpatient service for any post hospital discharge needs please contact the hospital for assistance.    If you have any questions for the [facility] physicians or need to reconsult for clinical or diagnostic changes please contact us via [facility] at [phone].   Well-developed, well nourished

## 2025-01-05 LAB
AV MEAN PRESS GRAD SYS DOP V1V2: 5 MMHG
AV VMAX SYS DOP: 157 CM/SEC
BH CV ECHO MEAS - AI P1/2T: 866.9 MSEC
BH CV ECHO MEAS - AO MAX PG: 9.9 MMHG
BH CV ECHO MEAS - AO ROOT DIAM: 3.5 CM
BH CV ECHO MEAS - AO V2 VTI: 35.2 CM
BH CV ECHO MEAS - AVA(I,D): 2.32 CM2
BH CV ECHO MEAS - EDV(CUBED): 140.6 ML
BH CV ECHO MEAS - EDV(MOD-SP2): 57.3 ML
BH CV ECHO MEAS - EDV(MOD-SP4): 77.9 ML
BH CV ECHO MEAS - EF(MOD-SP2): 61.1 %
BH CV ECHO MEAS - EF(MOD-SP4): 63.4 %
BH CV ECHO MEAS - ESV(CUBED): 32.8 ML
BH CV ECHO MEAS - ESV(MOD-SP2): 22.3 ML
BH CV ECHO MEAS - ESV(MOD-SP4): 28.5 ML
BH CV ECHO MEAS - FS: 38.5 %
BH CV ECHO MEAS - IVS/LVPW: 1.2 CM
BH CV ECHO MEAS - IVSD: 1.2 CM
BH CV ECHO MEAS - LA DIMENSION: 4.5 CM
BH CV ECHO MEAS - LAT PEAK E' VEL: 8.8 CM/SEC
BH CV ECHO MEAS - LV DIASTOLIC VOL/BSA (35-75): 39.4 CM2
BH CV ECHO MEAS - LV MASS(C)D: 220.8 GRAMS
BH CV ECHO MEAS - LV MAX PG: 6.2 MMHG
BH CV ECHO MEAS - LV MEAN PG: 3 MMHG
BH CV ECHO MEAS - LV SYSTOLIC VOL/BSA (12-30): 14.4 CM2
BH CV ECHO MEAS - LV V1 MAX: 124 CM/SEC
BH CV ECHO MEAS - LV V1 VTI: 26 CM
BH CV ECHO MEAS - LVIDD: 5.2 CM
BH CV ECHO MEAS - LVIDS: 3.2 CM
BH CV ECHO MEAS - LVOT AREA: 3.1 CM2
BH CV ECHO MEAS - LVOT DIAM: 2 CM
BH CV ECHO MEAS - LVPWD: 1 CM
BH CV ECHO MEAS - MED PEAK E' VEL: 4.8 CM/SEC
BH CV ECHO MEAS - MV A MAX VEL: 66.6 CM/SEC
BH CV ECHO MEAS - MV DEC SLOPE: 437 CM/SEC2
BH CV ECHO MEAS - MV E MAX VEL: 72.8 CM/SEC
BH CV ECHO MEAS - MV E/A: 1.09
BH CV ECHO MEAS - MV MEAN PG: 1 MMHG
BH CV ECHO MEAS - MV P1/2T: 49.2 MSEC
BH CV ECHO MEAS - MV V2 VTI: 26.9 CM
BH CV ECHO MEAS - MVA(P1/2T): 4.5 CM2
BH CV ECHO MEAS - MVA(VTI): 3 CM2
BH CV ECHO MEAS - RVDD: 3.4 CM
BH CV ECHO MEAS - SV(LVOT): 81.7 ML
BH CV ECHO MEAS - SV(MOD-SP2): 35 ML
BH CV ECHO MEAS - SV(MOD-SP4): 49.4 ML
BH CV ECHO MEAS - SVI(LVOT): 41.3 ML/M2
BH CV ECHO MEAS - SVI(MOD-SP2): 17.7 ML/M2
BH CV ECHO MEAS - SVI(MOD-SP4): 25 ML/M2
BH CV ECHO MEAS - TAPSE (>1.6): 2.24 CM
BH CV ECHO MEASUREMENTS AVERAGE E/E' RATIO: 10.71
LEFT ATRIUM VOLUME INDEX: 16.3 ML/M2
LV EF BIPLANE MOD: 59.9 %

## 2025-01-08 ENCOUNTER — TREATMENT (OUTPATIENT)
Dept: PHYSICAL THERAPY | Facility: CLINIC | Age: 75
End: 2025-01-08
Payer: MEDICARE

## 2025-01-08 DIAGNOSIS — M25.511 ACUTE PAIN OF RIGHT SHOULDER: ICD-10-CM

## 2025-01-08 DIAGNOSIS — Z47.89 ORTHOPEDIC AFTERCARE: Primary | ICD-10-CM

## 2025-01-08 DIAGNOSIS — Z98.890 S/P RIGHT ROTATOR CUFF REPAIR: ICD-10-CM

## 2025-01-08 DIAGNOSIS — Z98.890 STATUS POST DECOMPRESSION OF SUBACROMIAL SPACE: ICD-10-CM

## 2025-01-08 DIAGNOSIS — M25.611 DECREASED RANGE OF MOTION OF RIGHT SHOULDER: ICD-10-CM

## 2025-01-08 PROCEDURE — 97140 MANUAL THERAPY 1/> REGIONS: CPT | Performed by: OCCUPATIONAL THERAPIST

## 2025-01-08 PROCEDURE — 97110 THERAPEUTIC EXERCISES: CPT | Performed by: OCCUPATIONAL THERAPIST

## 2025-01-08 PROCEDURE — 97530 THERAPEUTIC ACTIVITIES: CPT | Performed by: OCCUPATIONAL THERAPIST

## 2025-01-08 NOTE — PROGRESS NOTES
Occupational Therapy Daily Treatment Note  Chanelle OT: [address] [place] [address]      Patient: Lawrence Olsen   : 1950  Diagnosis/ICD-10 Code:  Orthopedic aftercare [Z47.89]  Referring practitioner: Ramses Chery MD  Date of Initial Visit: Type: THERAPY  Noted: 2024  Today's Date: 2025  Patient seen for 5 sessions           Subjective   Lawrence Olsen reports: 6/10 R shoulder pain. Pt reports he suffered a TIA over the weekend. Pt also reports last night he was experiencing increased pain in both of his shoulders.    Objective     See Exercise, Manual, and Modality Logs for complete treatment.       Assessment/Plan  OT graded up AA flexion from supine to seated. OT added wall ladder and lower trap strengthening. Pt reports good tolerance. Pt continues with decreased fxl strength/ROM and increased pain that limits ability to complete ADLs/IADLs.  Progress per Plan of Care           Timed:  Manual Therapy:    10     mins  85225;  Therapeutic Exercise:    19     mins  97748;     Therapeutic Activity:     12     mins  40318;       Timed Treatment:   41   mins   Total Treatment:     41   mins    Start time: 08  End time: 912    Carrillo Reddy OT  Occupational Therapist  [place] License: [id number]  NPI: [id number]

## 2025-01-10 ENCOUNTER — TREATMENT (OUTPATIENT)
Dept: PHYSICAL THERAPY | Facility: CLINIC | Age: 75
End: 2025-01-10
Payer: MEDICARE

## 2025-01-10 DIAGNOSIS — Z98.890 S/P RIGHT ROTATOR CUFF REPAIR: ICD-10-CM

## 2025-01-10 DIAGNOSIS — Z47.89 ORTHOPEDIC AFTERCARE: Primary | ICD-10-CM

## 2025-01-10 DIAGNOSIS — M25.511 ACUTE PAIN OF RIGHT SHOULDER: ICD-10-CM

## 2025-01-10 DIAGNOSIS — M25.611 DECREASED RANGE OF MOTION OF RIGHT SHOULDER: ICD-10-CM

## 2025-01-10 DIAGNOSIS — Z98.890 STATUS POST DECOMPRESSION OF SUBACROMIAL SPACE: ICD-10-CM

## 2025-01-13 LAB
QT INTERVAL: 469 MS
QTC INTERVAL: 469 MS

## 2025-01-15 ENCOUNTER — HOSPITAL ENCOUNTER (OUTPATIENT)
Dept: OTHER | Facility: HOSPITAL | Age: 75
Discharge: HOME | End: 2025-01-15

## 2025-01-16 ENCOUNTER — TREATMENT (OUTPATIENT)
Dept: PHYSICAL THERAPY | Facility: CLINIC | Age: 75
End: 2025-01-16
Payer: MEDICARE

## 2025-01-16 DIAGNOSIS — Z47.89 ORTHOPEDIC AFTERCARE: Primary | ICD-10-CM

## 2025-01-16 DIAGNOSIS — M25.611 DECREASED RANGE OF MOTION OF RIGHT SHOULDER: ICD-10-CM

## 2025-01-16 DIAGNOSIS — Z98.890 S/P RIGHT ROTATOR CUFF REPAIR: ICD-10-CM

## 2025-01-16 DIAGNOSIS — M25.511 ACUTE PAIN OF RIGHT SHOULDER: ICD-10-CM

## 2025-01-16 DIAGNOSIS — Z98.890 STATUS POST DECOMPRESSION OF SUBACROMIAL SPACE: ICD-10-CM

## 2025-01-16 NOTE — PROGRESS NOTES
OT Re-evaluation/Progress Note  Chanelle  OT: [address]  [place] [address]    Patient: Lawrence Olsen   : 1950  Diagnosis/ICD-10 Code:  Orthopedic aftercare [Z47.89]  Referring practitioner: Ramses Chery MD  Date of Initial Visit: Type: THERAPY  Noted: 2024  Today's Date: 2025  Patient seen for 7 sessions      Subjective:   Subjective Questionnaire: QuickDASH:   Clinical Progress: improved  Home Program Compliance: Yes  Treatment has included: therapeutic exercise, manual therapy, and therapeutic activity    Subjective Evaluation    History of Present Illness    Subjective comment: Pt reports he recently saw MD and MD said he was ahead of schedule and doing well.Pain  Current pain ratin       Objective          Observations     Additional Shoulder Observation Details   Pt reports incisions are well healing with no concerning signs for infection.    Neurological Testing     Additional Neurological Details  Pt reports no current numbness/tingling.    Active Range of Motion     Right Elbow   Normal active range of motion  Forearm supination: with pain    Additional Active Range of Motion Details  Deferred secondary to early post op status.    Passive Range of Motion     Right Shoulder   Flexion: 140 degrees   Abduction: 150 degrees   External rotation 45°: 40 degrees   Internal rotation 45°: 40 degrees     Strength/Myotome Testing     Additional Strength Details  Deferred secondary to early post op status.  Deferred secondary to early post op status.        Assessment & Plan       Assessment  Impairments: abnormal or restricted ROM, activity intolerance, impaired physical strength, lacks appropriate home exercise program and pain with function   Functional limitations: carrying objects, lifting, sleeping, pulling, pushing, uncomfortable because of pain, moving in bed, reaching behind back and reaching overhead   Assessment details: Pt displays much improved PROM. Pt reports  decreased pain. Pt reports decreased disability as shown in improved QuickDASH score. Pt met 3/5 STGs. Pt continues with decreased fxl strength/ROM that limit ability to complete ADLs/IADLs.  Prognosis: good    Goals  Plan Goals: SHOULDER  PROBLEMS:     1. The patient has limited ROM of the R shoulder.    LTG 1: 12 weeks:  The patient will demonstrate 140 degrees of active shoulder flexion, 140 degrees of shoulder abduction, external rotation to behind head, and internal rotation to lumbar spine to allow the patient to reach into upper kitchen cabinets and manipulate clothing behind the back with greater ease.    STATUS:  New   STG 1a: 8 weeks:  The patient will demonstrate 140 degrees of passive shoulder flexion, 140 degrees of passive shoulder abduction, 40 degrees of passive shoulder external rotation, and 40 degrees of passive shoulder internal rotation.    STATUS:  Met   TREATMENT: Manual therapy, therapeutic exercise, home exercise instruction, and modalities as needed to include: electrical stimulation, ultrasound, moist heat, and ice.    2. The patient has limited strength of the R shoulder.   LTG 2: 12 weeks:  The patient will demonstrate 5 /5 strength for R shoulder flexion, abduction, external rotation, and internal rotation in order to demonstrate improved shoulder stability.    STATUS:  New   STG 2a: 8 weeks:  The patient will demonstrate ability to tolerate MMT for R shoulder flexion, abduction, external rotation, and internal rotation.    STATUS:  New   STG2b:  8 weeks:  The patient will be independent with home exercises.     STATUS:  Met   TREATMENT: Manual therapy, therapeutic exercise, home exercise instruction, and modalities as needed to include: electrical stimulation, ultrasound, moist heat, and ice.     3. The patient complains of pain to the R shoulder.   LTG 3: 12 weeks:  The patient will report a pain rating of 1 /10 at worst in order to improve sleep quality and tolerance to performance  of activities of daily living.    STATUS:  New   STG 3a: 8 weeks:  The patient will report a pain rating of 3 /10 at worst.     STATUS:  New   TREATMENT: Manual therapy, therapeutic exercise, home exercise instruction, and modalities as needed to include: electrical stimulation, ultrasound, moist heat, and ice.    4. Carrying, Moving, and Handling Objects Functional Limitation     LTG 4: 12 weeks:  The patient will demonstrate 1-19 % limitation by achieving a score of 19 on the QuickDASH.    STATUS:  New   STG 4a: 8 weeks:  The patient will demonstrate 20-39 % limitation by achieving a score of 27 on the QuickDASH.      STATUS:  Met   TREATMENT:  Manual therapy, therapeutic exercise, home exercise instruction, and modalities as needed to include: moist heat, electrical stimulation, and ultrasound.     Plan  Planned modality interventions: cryotherapy and thermotherapy (hydrocollator packs)  Planned therapy interventions: body mechanics training, fine motor coordination training, flexibility, functional ROM exercises, home exercise program, joint mobilization, manual therapy, neuromuscular re-education, postural training, soft tissue mobilization, strengthening, stretching and therapeutic activities  Frequency: 2x week  Duration in weeks: 12  Treatment plan discussed with: patient      Progress toward previous goals: Partially Met      Recommendations: Continue as planned  Timeframe: 2 months  Prognosis to achieve goals: good  Date of last progress/eval note: 12/19/24  OT SIGNATURE: Carrillo Reddy OT   DATE TREATMENT INITIATED: Type: THERAPY  Noted: 12/19/2024  [place] License: [id number]      Based upon review of the patient's progress and continued therapy plan, it is my medical opinion that Lawrence Olsen should continue occupational therapy treatment at [facility]  [address] [place] [id number]  [phone].    Signature: __________________________________   Ramses Chery MD MD NPI: [id number]  Timed:  Manual Therapy:    8     mins  67644;  Therapeutic Exercise:    10     mins  79168;      Therapeutic Activity:     20     mins  97283;       Timed Treatment:   38   mins   Total Treatment:     38   mins    Start time: 0758  End time: 0836    Please sign and return via fax to [phone]  . Thank you, [facility] Occupational Therapy.

## 2025-01-20 ENCOUNTER — TREATMENT (OUTPATIENT)
Dept: PHYSICAL THERAPY | Facility: CLINIC | Age: 75
End: 2025-01-20
Payer: MEDICARE

## 2025-01-20 DIAGNOSIS — M25.511 ACUTE PAIN OF RIGHT SHOULDER: ICD-10-CM

## 2025-01-20 DIAGNOSIS — Z98.890 S/P RIGHT ROTATOR CUFF REPAIR: ICD-10-CM

## 2025-01-20 DIAGNOSIS — Z47.89 ORTHOPEDIC AFTERCARE: Primary | ICD-10-CM

## 2025-01-20 DIAGNOSIS — M25.611 DECREASED RANGE OF MOTION OF RIGHT SHOULDER: ICD-10-CM

## 2025-01-20 DIAGNOSIS — Z98.890 STATUS POST DECOMPRESSION OF SUBACROMIAL SPACE: ICD-10-CM

## 2025-01-20 PROCEDURE — 97140 MANUAL THERAPY 1/> REGIONS: CPT | Performed by: OCCUPATIONAL THERAPIST

## 2025-01-20 PROCEDURE — 97110 THERAPEUTIC EXERCISES: CPT | Performed by: OCCUPATIONAL THERAPIST

## 2025-01-20 PROCEDURE — 97530 THERAPEUTIC ACTIVITIES: CPT | Performed by: OCCUPATIONAL THERAPIST

## 2025-01-20 NOTE — PROGRESS NOTES
Occupational Therapy Daily Treatment Note  Chanelle OT: [address] [place] [address]      Patient: Lawrence Olsen   : 1950  Diagnosis/ICD-10 Code:  Orthopedic aftercare [Z47.89]  Referring practitioner: Rasmes Chery MD  Date of Initial Visit: Type: THERAPY  Noted: 2024  Today's Date: 2025  Patient seen for 8 sessions           Subjective   Lawrence Olsen reports: 2-3/10 pain R shoulder.    Objective     See Exercise, Manual, and Modality Logs for complete treatment.       Assessment/Plan  OT provided supine shoulder flexion HEP. Teachback successful. OT graded up resistance with ER, IR, ext, add. Good tolerance to treatment this date. Pt continues with decreased fxl strength/ROM and increased pain that limits ability to complete ADLs/IADLs.  Progress per Plan of Care           Timed:  Manual Therapy:    8     mins  94029;  Therapeutic Exercise:    15     mins  59120;     Therapeutic Activity:     15     mins  68779;       Timed Treatment:   38   mins   Total Treatment:     38   mins    Start time: 0759  End time: 0837    Carrillo Reddy OT  Occupational Therapist  [place] License: [id number]  NPI: [id number]

## 2025-01-23 ENCOUNTER — TREATMENT (OUTPATIENT)
Dept: PHYSICAL THERAPY | Facility: CLINIC | Age: 75
End: 2025-01-23
Payer: MEDICARE

## 2025-01-23 DIAGNOSIS — Z98.890 S/P RIGHT ROTATOR CUFF REPAIR: ICD-10-CM

## 2025-01-23 DIAGNOSIS — M25.611 DECREASED RANGE OF MOTION OF RIGHT SHOULDER: ICD-10-CM

## 2025-01-23 DIAGNOSIS — Z47.89 ORTHOPEDIC AFTERCARE: Primary | ICD-10-CM

## 2025-01-23 DIAGNOSIS — Z98.890 STATUS POST DECOMPRESSION OF SUBACROMIAL SPACE: ICD-10-CM

## 2025-01-23 DIAGNOSIS — M25.511 ACUTE PAIN OF RIGHT SHOULDER: ICD-10-CM

## 2025-01-23 NOTE — PROGRESS NOTES
Occupational Therapy Daily Treatment Note  Chanelle OT: [address] [place] [address]      Patient: Lawrence Olsen   : 1950  Diagnosis/ICD-10 Code:  Orthopedic aftercare [Z47.89]  Referring practitioner: Ramses Chery MD  Date of Initial Visit: Type: THERAPY  Noted: 2024  Today's Date: 2025  Patient seen for 9 sessions           Subjective   Lawrence Olsen reports: No current pain. Pt reports R shoulder is doing well.    Objective     See Exercise, Manual, and Modality Logs for complete treatment.       Assessment/Plan  OT graded up flexion from semi supine to standing. OT graded up reps with ext, add, ER, IR, rows and lower trap strengthening. Good tolerance to treatment. OT provided resisted ER, IR, add, and ext HEP. Teachback successful. OT added light biceps hammer curls. Pt continues with decreased fxl strength/ROM and increased pain that limits ability to complete ADLs/IADLs.  Progress per Plan of Care           Timed:  Manual Therapy:    8     mins  32290;  Therapeutic Exercise:    15     mins  87663;      Therapeutic Activity:     15     mins  03757;       Timed Treatment:   38   mins   Total Treatment:     38   mins    Start time: 0800  End time: 0838    Carrillo Reddy OT  Occupational Therapist  [place] License: [id number]  NPI: [id number]

## 2025-01-27 ENCOUNTER — TREATMENT (OUTPATIENT)
Dept: PHYSICAL THERAPY | Facility: CLINIC | Age: 75
End: 2025-01-27
Payer: MEDICARE

## 2025-01-27 DIAGNOSIS — Z98.890 S/P RIGHT ROTATOR CUFF REPAIR: ICD-10-CM

## 2025-01-27 DIAGNOSIS — Z47.89 ORTHOPEDIC AFTERCARE: Primary | ICD-10-CM

## 2025-01-27 DIAGNOSIS — Z98.890 STATUS POST DECOMPRESSION OF SUBACROMIAL SPACE: ICD-10-CM

## 2025-01-27 DIAGNOSIS — M25.611 DECREASED RANGE OF MOTION OF RIGHT SHOULDER: ICD-10-CM

## 2025-01-27 DIAGNOSIS — M25.511 ACUTE PAIN OF RIGHT SHOULDER: ICD-10-CM

## 2025-01-27 PROCEDURE — 97140 MANUAL THERAPY 1/> REGIONS: CPT | Performed by: OCCUPATIONAL THERAPIST

## 2025-01-27 PROCEDURE — 97110 THERAPEUTIC EXERCISES: CPT | Performed by: OCCUPATIONAL THERAPIST

## 2025-01-27 PROCEDURE — 97530 THERAPEUTIC ACTIVITIES: CPT | Performed by: OCCUPATIONAL THERAPIST

## 2025-01-27 NOTE — PROGRESS NOTES
"Occupational Therapy Daily Treatment Note  Chanelle OT: [address] [place] [address]      Patient: Lawrence Olsen   : 1950  Diagnosis/ICD-10 Code:  Orthopedic aftercare [Z47.89]  Referring practitioner: Ramses Chery MD  Date of Initial Visit: Type: THERAPY  Noted: 2024  Today's Date: 2025  Patient seen for 10 sessions           Subjective   Lawrence Olsen reports: \"I'm doing pretty good\" No current pain.    Objective     See Exercise, Manual, and Modality Logs for complete treatment.       Assessment/Plan  OT graded up resistance with biceps curls and rows. Good tolerance to treatment. Pt continues with decreased fxl strength/ROM and increased pain that limits ability to complete ADLs/IADLs.  Progress per Plan of Care           Timed:  Manual Therapy:    10     mins  28284;  Therapeutic Exercise:    12     mins  27952;     Therapeutic Activity:     16     mins  51147;       Timed Treatment:   38   mins   Total Treatment:     38   mins    Start time: 0759  End time: 0837    Carrillo Reddy OT  Occupational Therapist  [place] License: [id number]  NPI: [id number]  "

## 2025-01-30 ENCOUNTER — TREATMENT (OUTPATIENT)
Dept: PHYSICAL THERAPY | Facility: CLINIC | Age: 75
End: 2025-01-30
Payer: MEDICARE

## 2025-01-30 DIAGNOSIS — M25.511 ACUTE PAIN OF RIGHT SHOULDER: ICD-10-CM

## 2025-01-30 DIAGNOSIS — Z98.890 STATUS POST DECOMPRESSION OF SUBACROMIAL SPACE: ICD-10-CM

## 2025-01-30 DIAGNOSIS — M25.611 DECREASED RANGE OF MOTION OF RIGHT SHOULDER: ICD-10-CM

## 2025-01-30 DIAGNOSIS — Z47.89 ORTHOPEDIC AFTERCARE: Primary | ICD-10-CM

## 2025-01-30 DIAGNOSIS — Z98.890 S/P RIGHT ROTATOR CUFF REPAIR: ICD-10-CM

## 2025-01-30 NOTE — PROGRESS NOTES
Occupational Therapy Daily Treatment Note  Chanelle OT: [address] [place] [address]      Patient: Lawrence Olsen   : 1950  Diagnosis/ICD-10 Code:  Orthopedic aftercare [Z47.89]  Referring practitioner: Ramses Chery MD  Date of Initial Visit: Type: THERAPY  Noted: 2024  Today's Date: 2025  Patient seen for 11 sessions           Subjective   Lawrence Olsen reports: No current pain. Pt reports he did a lot of cooking for [employer] recently and reports his shoulder held up well. Pt reports it was a little sore but overall did good.    Objective     See Exercise, Manual, and Modality Logs for complete treatment.       Assessment/Plan  OT graded up resistance with flx and reps with resisted IR, ER, add, ext. OT also graded up sets with rows. OT provided active abd HEP. Teachback successful. Good tolerance to treatment this date. Pt continues with decreased fxl strength/ROM and increased pain that limits ability to complete ADLs/IADLs.  Progress per Plan of Care           Timed:  Manual Therapy:    10     mins  82028;  Therapeutic Exercise:    14     mins  63762;     Therapeutic Activity:     15     mins  89278;       Timed Treatment:   39   mins   Total Treatment:     39   mins    Start time: 0800  End time: 0839    Carrillo Reddy OT  Occupational Therapist  [place] License: [id number]  NPI: [id number]

## 2025-02-03 ENCOUNTER — TREATMENT (OUTPATIENT)
Dept: PHYSICAL THERAPY | Facility: CLINIC | Age: 75
End: 2025-02-03
Payer: MEDICARE

## 2025-02-03 DIAGNOSIS — Z47.89 ORTHOPEDIC AFTERCARE: Primary | ICD-10-CM

## 2025-02-03 DIAGNOSIS — M25.511 ACUTE PAIN OF RIGHT SHOULDER: ICD-10-CM

## 2025-02-03 DIAGNOSIS — Z98.890 S/P RIGHT ROTATOR CUFF REPAIR: ICD-10-CM

## 2025-02-03 DIAGNOSIS — M25.611 DECREASED RANGE OF MOTION OF RIGHT SHOULDER: ICD-10-CM

## 2025-02-03 DIAGNOSIS — Z98.890 STATUS POST DECOMPRESSION OF SUBACROMIAL SPACE: ICD-10-CM

## 2025-02-03 PROCEDURE — 97530 THERAPEUTIC ACTIVITIES: CPT | Performed by: OCCUPATIONAL THERAPIST

## 2025-02-03 PROCEDURE — 97110 THERAPEUTIC EXERCISES: CPT | Performed by: OCCUPATIONAL THERAPIST

## 2025-02-03 PROCEDURE — 97140 MANUAL THERAPY 1/> REGIONS: CPT | Performed by: OCCUPATIONAL THERAPIST

## 2025-02-03 NOTE — PROGRESS NOTES
Occupational Therapy Daily Treatment Note  [place] OT: [address] [place] [address]      Patient: Lawrence Olsen   : 1950  Diagnosis/ICD-10 Code:  Orthopedic aftercare [Z47.89]  Referring practitioner: Ramses Chery MD  Date of Initial Visit: Type: THERAPY  Noted: 2024  Today's Date: 2/3/2025  Patient seen for 12 sessions           Subjective   Lawrence Olsen reports: No current pain.    Objective     See Exercise, Manual, and Modality Logs for complete treatment.       Assessment/Plan  OT provided resisted flx HEP. Teachback successful. OT graded up resistance with abd, rows and biceps curls. Pt continues with decreased fxl strength/ROM and increased pain that limits ability to complete ADLs/IADLs.  Progress per Plan of Care           Timed:  Manual Therapy:    8     mins  22062;  Therapeutic Exercise:    19     mins  00651;       Therapeutic Activity:     15     mins  83676;       Timed Treatment:   42   mins   Total Treatment:     42   mins    Start time: 0800  End time: 0842    Carrillo Reddy OT  Occupational Therapist  [place] License: [id number]  NPI: [id number]

## 2025-02-06 ENCOUNTER — TREATMENT (OUTPATIENT)
Dept: PHYSICAL THERAPY | Facility: CLINIC | Age: 75
End: 2025-02-06
Payer: MEDICARE

## 2025-02-06 DIAGNOSIS — Z47.89 ORTHOPEDIC AFTERCARE: Primary | ICD-10-CM

## 2025-02-06 DIAGNOSIS — Z98.890 S/P RIGHT ROTATOR CUFF REPAIR: ICD-10-CM

## 2025-02-06 DIAGNOSIS — M25.611 DECREASED RANGE OF MOTION OF RIGHT SHOULDER: ICD-10-CM

## 2025-02-06 DIAGNOSIS — M25.511 ACUTE PAIN OF RIGHT SHOULDER: ICD-10-CM

## 2025-02-06 DIAGNOSIS — Z98.890 STATUS POST DECOMPRESSION OF SUBACROMIAL SPACE: ICD-10-CM

## 2025-02-06 NOTE — PROGRESS NOTES
Occupational Therapy Daily Treatment Note  [place] OT: [address] [place] [address]      Patient: Lawrence Olsen   : 1950  Diagnosis/ICD-10 Code:  Orthopedic aftercare [Z47.89]  Referring practitioner: Ramses Chery MD  Date of Initial Visit: Type: THERAPY  Noted: 2024  Today's Date: 2025  Patient seen for 13 sessions           Subjective   Lawrence Olsen reports: 2/10 pain R shoulder. Pt reports this began sometime last night.    Objective     See Exercise, Manual, and Modality Logs for complete treatment.       Assessment/Plan  OT graded up resistance with abd and reps with resisted flx and curls. Good tolerance to treatment. Pt continues with decreased fxl strength/ROM and increased pain that limits ability to complete ADLs/IADLs.  Progress per Plan of Care           Timed:  Manual Therapy:    8     mins  30826;  Therapeutic Exercise:    15     mins  11230;     Therapeutic Activity:     15     mins  06387;       Timed Treatment:   38   mins   Total Treatment:     38   mins    Start time: 0758  End time: 0836    Carrillo Reddy OT  Occupational Therapist  [place] License: [id number]  NPI: [id number]

## 2025-02-10 ENCOUNTER — TREATMENT (OUTPATIENT)
Dept: PHYSICAL THERAPY | Facility: CLINIC | Age: 75
End: 2025-02-10
Payer: MEDICARE

## 2025-02-10 DIAGNOSIS — M25.611 DECREASED RANGE OF MOTION OF RIGHT SHOULDER: ICD-10-CM

## 2025-02-10 DIAGNOSIS — M25.511 ACUTE PAIN OF RIGHT SHOULDER: ICD-10-CM

## 2025-02-10 DIAGNOSIS — Z98.890 S/P RIGHT ROTATOR CUFF REPAIR: ICD-10-CM

## 2025-02-10 DIAGNOSIS — Z98.890 STATUS POST DECOMPRESSION OF SUBACROMIAL SPACE: ICD-10-CM

## 2025-02-10 DIAGNOSIS — Z47.89 ORTHOPEDIC AFTERCARE: Primary | ICD-10-CM

## 2025-02-10 PROCEDURE — 97530 THERAPEUTIC ACTIVITIES: CPT | Performed by: OCCUPATIONAL THERAPIST

## 2025-02-10 PROCEDURE — 97110 THERAPEUTIC EXERCISES: CPT | Performed by: OCCUPATIONAL THERAPIST

## 2025-02-10 PROCEDURE — 97140 MANUAL THERAPY 1/> REGIONS: CPT | Performed by: OCCUPATIONAL THERAPIST

## 2025-02-10 NOTE — PROGRESS NOTES
Occupational Therapy Daily Treatment Note  [place] OT: [address] [place] [address]      Patient: Lawrence Olsen   : 1950  Diagnosis/ICD-10 Code:  Orthopedic aftercare [Z47.89]  Referring practitioner: Ramses Chery MD  Date of Initial Visit: Type: THERAPY  Noted: 2024  Today's Date: 2/10/2025  Patient seen for 14 sessions           Subjective   Lawrence Olsen reports: No current pain. Pt reports he had pain yesterday but reports he worked his shoulder pretty good over the weekend.    Objective     See Exercise, Manual, and Modality Logs for complete treatment.       Assessment/Plan  OT graded up resistance with resisted flx, IR, ER, add, ext and biceps curls. OT graded up reps with resisted abd. Pt continues with decreased fxl strength/ROM and increased pain that limits ability to complete ADLs/IADLs.  Progress per Plan of Care           Timed:  Manual Therapy:    8     mins  34494;  Therapeutic Exercise:    15     mins  66248;      Therapeutic Activity:     15     mins  74557;       Timed Treatment:   38   mins   Total Treatment:     38   mins    Start time: 727  End time: 835    Carrillo Reddy OT  Occupational Therapist  [place] License: [id number]  NPI: [id number]

## 2025-02-13 ENCOUNTER — TREATMENT (OUTPATIENT)
Dept: PHYSICAL THERAPY | Facility: CLINIC | Age: 75
End: 2025-02-13
Payer: MEDICARE

## 2025-02-13 DIAGNOSIS — Z98.890 S/P RIGHT ROTATOR CUFF REPAIR: ICD-10-CM

## 2025-02-13 DIAGNOSIS — M25.611 DECREASED RANGE OF MOTION OF RIGHT SHOULDER: ICD-10-CM

## 2025-02-13 DIAGNOSIS — Z47.89 ORTHOPEDIC AFTERCARE: Primary | ICD-10-CM

## 2025-02-13 DIAGNOSIS — Z98.890 STATUS POST DECOMPRESSION OF SUBACROMIAL SPACE: ICD-10-CM

## 2025-02-13 DIAGNOSIS — M25.511 ACUTE PAIN OF RIGHT SHOULDER: ICD-10-CM

## 2025-02-13 NOTE — PROGRESS NOTES
Occupational Therapy Daily Treatment Note  [place] OT: [address] [place] [address]      Patient: Lawrence Olsen   : 1950  Diagnosis/ICD-10 Code:  Orthopedic aftercare [Z47.89]  Referring practitioner: Ramses Chery MD  Date of Initial Visit: Type: THERAPY  Noted: 2024  Today's Date: 2025  Patient seen for 15 sessions           Subjective   Lawrence Olsen reports: No current pain.    Objective     See Exercise, Manual, and Modality Logs for complete treatment.       Assessment/Plan  OT increased reps with Tband resisted shoulder strengthening. Pt reports mild (2/10) pain with resisted flexion but tolerated remainder of treatment well. Pt continues with decreased fxl strength/ROM and increased pain that limits ability to complete ADLs/IADLs.  Progress per Plan of Care           Timed:  Manual Therapy:    8     mins  28282;  Therapeutic Exercise:    14     mins  00646;      Therapeutic Activity:     9     mins  70419;       Timed Treatment:   31   mins   Total Treatment:     31   mins    Start time: 0759  End time: 0830    Carrillo Reddy OT  Occupational Therapist  [place] License: [id number]  NPI: [id number]

## 2025-02-20 ENCOUNTER — TREATMENT (OUTPATIENT)
Dept: PHYSICAL THERAPY | Facility: CLINIC | Age: 75
End: 2025-02-20
Payer: MEDICARE

## 2025-02-20 DIAGNOSIS — Z98.890 S/P RIGHT ROTATOR CUFF REPAIR: ICD-10-CM

## 2025-02-20 DIAGNOSIS — Z98.890 STATUS POST DECOMPRESSION OF SUBACROMIAL SPACE: ICD-10-CM

## 2025-02-20 DIAGNOSIS — Z47.89 ORTHOPEDIC AFTERCARE: Primary | ICD-10-CM

## 2025-02-20 DIAGNOSIS — M25.611 DECREASED RANGE OF MOTION OF RIGHT SHOULDER: ICD-10-CM

## 2025-02-20 DIAGNOSIS — M25.511 ACUTE PAIN OF RIGHT SHOULDER: ICD-10-CM

## 2025-02-20 NOTE — PROGRESS NOTES
OT Re-evaluation/Progress/Discharge Note  [place]  OT: [address]  [place] [address]    Patient: Lawrence Olsen   : 1950  Diagnosis/ICD-10 Code:  Orthopedic aftercare [Z47.89]  Referring practitioner: Ramses Chery MD  Date of Initial Visit: Type: THERAPY  Noted: 2024  Today's Date: 2025  Patient seen for 16 sessions      Subjective:   Subjective Questionnaire: QuickDASH:   Clinical Progress: improved  Home Program Compliance: Yes  Treatment has included: therapeutic exercise, manual therapy, and therapeutic activity    Subjective Evaluation    Pain  Current pain ratin         Objective          Observations     Additional Shoulder Observation Details   Pt reports incisions are well healing with no concerning signs for infection.    Neurological Testing     Additional Neurological Details  Pt reports no current numbness/tingling.    Active Range of Motion     Right Shoulder   Flexion: 145 degrees   Abduction: WFL  External rotation BTH: T1   Internal rotation BTB: lumbar     Right Elbow   Normal active range of motion    Passive Range of Motion     Right Shoulder   Flexion: 155 degrees   Abduction: 170 degrees   External rotation 90°: 60 degrees   Internal rotation 90°: 65 degrees     Strength/Myotome Testing     Right Shoulder     Planes of Motion   Flexion: 5   Extension: 5   Abduction: 5   Adduction: 5   External rotation at 0°: 5   Internal rotation at 0°: 5   Horizontal abduction: 5   Horizontal adduction: 5     Additional Strength Details          Assessment & Plan       Assessment  Impairments: abnormal or restricted ROM, activity intolerance, impaired physical strength, lacks appropriate home exercise program and pain with function   Functional limitations: carrying objects, lifting, sleeping, pulling, pushing, uncomfortable because of pain, moving in bed, reaching behind back and reaching overhead   Assessment details: Pt displays improved P/AROM and strength. Pt  reports decreased pain. Pt reports decreased disability as shown in improved QuickDASH score. Pt met 5/5 STGs and 3/4 LTGs. OT recommends discharge to [facility] at this time. Pt agreeable.  Prognosis: good    Goals  Plan Goals: SHOULDER  PROBLEMS:     1. The patient has limited ROM of the R shoulder.    LTG 1: 12 weeks:  The patient will demonstrate 140 degrees of active shoulder flexion, 140 degrees of shoulder abduction, external rotation to behind head, and internal rotation to lumbar spine to allow the patient to reach into upper kitchen cabinets and manipulate clothing behind the back with greater ease.    STATUS:  Met   STG 1a: 8 weeks:  The patient will demonstrate 140 degrees of passive shoulder flexion, 140 degrees of passive shoulder abduction, 40 degrees of passive shoulder external rotation, and 40 degrees of passive shoulder internal rotation.    STATUS:  Met   TREATMENT: Manual therapy, therapeutic exercise, home exercise instruction, and modalities as needed to include: electrical stimulation, ultrasound, moist heat, and ice.    2. The patient has limited strength of the R shoulder.   LTG 2: 12 weeks:  The patient will demonstrate 5 /5 strength for R shoulder flexion, abduction, external rotation, and internal rotation in order to demonstrate improved shoulder stability.    STATUS:  Met   STG 2a: 8 weeks:  The patient will demonstrate ability to tolerate MMT for R shoulder flexion, abduction, external rotation, and internal rotation.    STATUS:  Met   STG2b:  8 weeks:  The patient will be independent with home exercises.     STATUS:  Met   TREATMENT: Manual therapy, therapeutic exercise, home exercise instruction, and modalities as needed to include: electrical stimulation, ultrasound, moist heat, and ice.     3. The patient complains of pain to the R shoulder.   LTG 3: 12 weeks:  The patient will report a pain rating of 1 /10 at worst in order to improve sleep quality and tolerance to performance of  activities of daily living.    STATUS:  Not met (3/10)   STG 3a: 8 weeks:  The patient will report a pain rating of 3 /10 at worst.     STATUS:  Met   TREATMENT: Manual therapy, therapeutic exercise, home exercise instruction, and modalities as needed to include: electrical stimulation, ultrasound, moist heat, and ice.    4. Carrying, Moving, and Handling Objects Functional Limitation     LTG 4: 12 weeks:  The patient will demonstrate 1-19 % limitation by achieving a score of 19 on the QuickDASH.    STATUS:  Met   STG 4a: 8 weeks:  The patient will demonstrate 20-39 % limitation by achieving a score of 27 on the QuickDASH.      STATUS:  Met   TREATMENT:  Manual therapy, therapeutic exercise, home exercise instruction, and modalities as needed to include: moist heat, electrical stimulation, and ultrasound.     Plan  Planned modality interventions: cryotherapy and thermotherapy (hydrocollator packs)  Planned therapy interventions: body mechanics training, fine motor coordination training, flexibility, functional ROM exercises, home exercise program, joint mobilization, manual therapy, neuromuscular re-education, postural training, soft tissue mobilization, strengthening, stretching and therapeutic activities  Frequency: 2x week  Duration in weeks: 12  Treatment plan discussed with: patient      Progress toward previous goals: Partially Met      Recommendations: Discharge  Date of last progress/eval note: 1/16/25  OT SIGNATURE: Carrillo Reddy, MIAH   DATE TREATMENT INITIATED: Type: THERAPY  Noted: 12/19/2024  [place] License: [id number]      Based upon review of the patient's progress and continued therapy plan, it is my medical opinion that Lawrence Olsen should continue occupational therapy treatment at [facility]  [address] [place] [id number]  [phone].    Signature: __________________________________  Ramses Chery MD MD NPI: [id number]  Timed:  Therapeutic  Exercise:    23     mins  24617;     Therapeutic Activity:     15     mins  13462;       Timed Treatment:   38   mins   Total Treatment:     38   mins    Start time: 0758  End time: 0836    Please sign and return via fax to [phone]  . Thank you, [facility] Occupational Therapy.

## 2025-02-21 ENCOUNTER — TRANSCRIBE ORDERS (OUTPATIENT)
Dept: ADMINISTRATIVE | Facility: HOSPITAL | Age: 75
End: 2025-02-21
Payer: MEDICARE

## 2025-03-11 ENCOUNTER — HOSPITAL ENCOUNTER (OUTPATIENT)
Dept: ULTRASOUND IMAGING | Facility: HOSPITAL | Age: 75
Discharge: HOME | End: 2025-03-11
Payer: MEDICARE

## 2025-03-11 DIAGNOSIS — E04.1 THYROID NODULE: ICD-10-CM

## 2025-03-11 PROCEDURE — 25010000002 LIDOCAINE 1 % SOLUTION

## 2025-03-11 PROCEDURE — 88173 CYTOPATH EVAL FNA REPORT: CPT | Performed by: FAMILY MEDICINE

## 2025-03-11 PROCEDURE — 76942 ECHO GUIDE FOR BIOPSY: CPT

## 2025-03-11 RX ORDER — LIDOCAINE HYDROCHLORIDE 10 MG/ML
INJECTION, SOLUTION INFILTRATION; PERINEURAL
Status: COMPLETED
Start: 2025-03-11 | End: 2025-03-11

## 2025-03-11 RX ADMIN — LIDOCAINE HYDROCHLORIDE: 10 INJECTION, SOLUTION INFILTRATION; PERINEURAL at 10:21

## 2025-05-19 ENCOUNTER — TELEPHONE (OUTPATIENT)
Dept: UROLOGY | Age: 75
End: 2025-05-19
Payer: MEDICARE

## 2025-05-19 NOTE — TELEPHONE ENCOUNTER
----- Message from Marlys SKAGGS sent at 5/19/2025  7:32 AM EDT -----  Please move patients upcoming appt to NP.

## 2025-06-20 ENCOUNTER — OFFICE VISIT (OUTPATIENT)
Dept: UROLOGY | Age: 75
End: 2025-06-20
Payer: MEDICARE

## 2025-06-20 VITALS — WEIGHT: 193 LBS | RESPIRATION RATE: 14 BRPM | BODY MASS INDEX: 32.15 KG/M2 | HEIGHT: 65 IN

## 2025-06-20 DIAGNOSIS — R30.0 DYSURIA: ICD-10-CM

## 2025-06-20 DIAGNOSIS — N40.1 BPH WITH OBSTRUCTION/LOWER URINARY TRACT SYMPTOMS: Primary | ICD-10-CM

## 2025-06-20 DIAGNOSIS — R33.9 URINARY RETENTION: ICD-10-CM

## 2025-06-20 DIAGNOSIS — N13.8 BPH WITH OBSTRUCTION/LOWER URINARY TRACT SYMPTOMS: Primary | ICD-10-CM

## 2025-06-20 LAB — URINE VOLUME: 88

## 2025-06-20 PROCEDURE — 87086 URINE CULTURE/COLONY COUNT: CPT | Performed by: NURSE PRACTITIONER

## 2025-06-20 RX ORDER — ASPIRIN 81 MG/1
81 TABLET ORAL DAILY
COMMUNITY
Start: 2025-06-04

## 2025-06-20 RX ORDER — TAMSULOSIN HYDROCHLORIDE 0.4 MG/1
2 CAPSULE ORAL DAILY
Qty: 180 CAPSULE | Refills: 4 | Status: SHIPPED | OUTPATIENT
Start: 2025-06-20

## 2025-06-20 RX ORDER — FINASTERIDE 5 MG/1
TABLET, FILM COATED ORAL
Qty: 90 TABLET | Refills: 4 | Status: SHIPPED | OUTPATIENT
Start: 2025-06-20 | End: 2026-06-20

## 2025-06-20 NOTE — PROGRESS NOTES
Chief Complaint: Benign Prostatic Hypertrophy    Subjective         History of Present Illness  Lawrence Olsen is a 75 y.o. male presents to [facility] to be seen for BPH.    Patient was previously seen by Dr. Samuels with last visit on 12/5/2024 for BPH.  Patient was on finasteride and tamsulosin and doing well on those medicines.  He is here for follow-up.    History of Present Illness  The patient is a 75-year-old male who presents for evaluation of benign prostatic hyperplasia (BPH).    He reports nocturia, with an average of 1 to 2 episodes per night, and intermittent dysuria over the past few weeks. Additionally, he experiences a sensation of incomplete bladder emptying, which has led to increased frequency of nighttime urination. These symptoms are not consistently present during the day. He is currently on a regimen of finasteride and tamsulosin, which he believes to be effective when his symptoms are not exacerbated.     He expresses a preference for avoiding prostate procedures if possible but acknowledges the potential need for such interventions in the future. He is aware of his enlarged prostate and recalls undergoing cystoscopy several years ago. He typically administers his medication before bedtime.      PSA  7/12/2024 1.81, on finasteride, corrected = 3.62  3/14/2022 4.34      Previous 12/5/2024:  74-year-old white male is here for evaluation of his prostate.  Patient has been on finasteride 5 mg daily and tamsulosin 0.4 mg PC and is doing well.  He is getting up once at night because he cannot sleep in bed secondary to his right shoulder surgery.  Stream is still weak 50% of the time.  AUA score is 13/35 and quality score is 2.     Patient has no dysuria or gross hematuria.  No family history of prostate cancer.     PSA on 7/12/2024 was 1.810.  Patient being on finasteride it comes out to be 3.620.     Patient has right shoulder surgery and has a brace on right side  restricting his examination.    Objective     Past Medical History:   Diagnosis Date    Ankle sprain Unknown    Arthritis     Arthritis of back 2020    Bladder disorder     BPH with urinary obstruction     Cancer     SKIN    Chest pain     Colon polyp     Elevated PSA 07/21/2017    HBP (high blood pressure)     High cholesterol     Hip arthrosis 2017    Knee swelling 2020    Limb pain     Limb swelling     Low back strain ?    Prostate disorder     Tear of meniscus of knee 12/23       Past Surgical History:   Procedure Laterality Date    CHOLECYSTECTOMY      COLONOSCOPY      COLONOSCOPY N/A 09/09/2024    Procedure: COLONOSCOPY WITH POLYPECTOMIES;  Surgeon: José Miguel Mckay MD;  Location: [facility] ENDOSCOPY;  Service: General;  Laterality: N/A;  DIVERTICULOSIS, COLON POLYPS    GALLBLADDER SURGERY      HERNIA REPAIR Bilateral     SHOULDER ARTHROSCOPY W/ ROTATOR CUFF REPAIR Right          Current Outpatient Medications:     acetaminophen (TYLENOL) 500 MG tablet, Take 1 tablet by mouth Every 6 (Six) Hours As Needed for Mild Pain., Disp: , Rfl:     amLODIPine-benazepril (LOTREL) 10-20 MG per capsule, Take 1 capsule by mouth Daily., Disp: , Rfl:     aspirin 81 MG EC tablet, Take 1 tablet by mouth Daily., Disp: , Rfl:     cyclobenzaprine (FLEXERIL) 10 MG tablet, Take 1 tablet by mouth As Needed for Muscle Spasms., Disp: , Rfl:     finasteride (PROSCAR) 5 MG tablet, 1 tablet daily, Disp: 90 tablet, Rfl: 4    tamsulosin (FLOMAX) 0.4 MG capsule 24 hr capsule, Take 2 capsules by mouth Daily., Disp: 180 capsule, Rfl: 4    No Known Allergies     Family History   Problem Relation Age of Onset    Kidney nephrosis Mother     Cancer Father     Heart attack Father     Colon cancer Father 50    Colon cancer Sister 50    Cancer Brother     Cancer Sister        Social History     Socioeconomic History    Marital status:     Number of children: 3   Tobacco Use    Smoking status: Former     Current packs/day: 0.00     Average  "packs/day: 0.3 packs/day for 1 year (0.3 ttl pk-yrs)     Types: Cigarettes     Start date: 10/10/1970     Quit date: 10/10/1971     Years since quittin.7     Passive exposure: Past    Smokeless tobacco: Never    Tobacco comments:     Teen   Vaping Use    Vaping status: Never Used   Substance and Sexual Activity    Alcohol use: Yes     Alcohol/week: 2.0 standard drinks of alcohol     Types: 2 Cans of beer per week     Comment: Occasional    Drug use: Never    Sexual activity: Yes     Partners: Female     Birth control/protection: Condom     Comment: Spouse only       Vital Signs:   Resp 14   Ht 165.1 cm (65\")   Wt 87.5 kg (193 lb)   BMI 32.12 kg/m²      Physical Exam  Vitals reviewed.   Constitutional:       Appearance: Normal appearance.   Neurological:      General: No focal deficit present.      Mental Status: He is alert and oriented to person, place, and time.   Psychiatric:         Mood and Affect: Mood normal.         Behavior: Behavior normal.          Result Review :   The following data was reviewed by: WILLIAMS Richards on 2025:     PSA          2024    16:37   PSA   PSA 1.810      Bladder Scan interpretation 2025    Estimation of residual urine via BVI 3000 [address]athon Bladder Scan  MA/nurse performing: Teresa LUX MA  Residual Urine: 88 ml  Indication: BPH with obstruction/lower urinary tract symptoms    Dysuria    Urinary retention   Position: Supine  Examination: Incremental scanning of the suprapubic area using 2.0 MHz transducer using copious amounts of acoustic gel.   Findings: An anechoic area was demonstrated which represented the bladder, with measurement of residual urine as noted. I inspected this myself. In that the residual urine was stable or insignificant, refer to plan for treatment and plan necessary at this time.       Results  Diagnostic Testing   - Post-void residual urine volume: 88 mL      Procedures        Assessment and Plan    Diagnoses and all " orders for this visit:    1. BPH with obstruction/lower urinary tract symptoms (Primary)  -     Bladder Scan  -     Cystoscopy; Future  -     tamsulosin (FLOMAX) 0.4 MG capsule 24 hr capsule; Take 2 capsules by mouth Daily.  Dispense: 180 capsule; Refill: 4  -     finasteride (PROSCAR) 5 MG tablet; 1 tablet daily  Dispense: 90 tablet; Refill: 4    2. Dysuria  -     Urine Culture - Urine, Urine, Clean Catch; Future  -     Urine Culture - Urine, Urine, Clean Catch    3. Urinary retention  -     Cystoscopy; Future  -     tamsulosin (FLOMAX) 0.4 MG capsule 24 hr capsule; Take 2 capsules by mouth Daily.  Dispense: 180 capsule; Refill: 4        Assessment & Plan  1. Benign Prostatic Hyperplasia (BPH).  Reports getting up once or twice a night to urinate, with occasional stinging sensations during urination and a feeling of incomplete bladder emptying. A urine culture will be conducted to rule out any potential infection. The dosage of tamsulosin will be increased to 2 capsules daily, either 1 in the morning and 1 at night, or both simultaneously at night due to potential dizziness. He will continue with finasteride. A referral to Dr. Santiago for a cystoscopy will be made to further investigate the cause of the residual urine in the bladder. If an infection is detected, appropriate treatment will be initiated. Discussed potential procedures for BPH, including TURP, aqua ablation, and Rezum. The patient prefers to avoid procedures but acknowledges they may be necessary if symptoms persist or worsen.      Follow Up   Return for cystoscopy with Jack.  Patient was given instructions and counseling regarding his condition or for health maintenance advice. Please see specific information pulled into the AVS if appropriate.         This document has been electronically signed by Melanie Davis, WILLIAMS  June 20, 2025 13:41 EDT     Patient or patient representative verbalized consent for the use of Ambient Listening  during the visit with  WILLIAMS Richards for chart documentation. 6/20/2025  13:41 EDT

## 2025-06-21 LAB — BACTERIA SPEC AEROBE CULT: NO GROWTH

## (undated) DEVICE — SOL IRRG H2O PL/BG 1000ML STRL

## (undated) DEVICE — SOL IRR NACL 0.9PCT BT 1000ML

## (undated) DEVICE — SINGLE-USE BIOPSY FORCEPS: Brand: RADIAL JAW 4

## (undated) DEVICE — SOLIDIFIER LIQLOC PLS 1500CC BT

## (undated) DEVICE — CONN JET HYDRA H20 AUXILIARY DISP

## (undated) DEVICE — SNAR POLYP CAPTIFLEX XS/OVL 11X2.4MM 240CM 1P/U

## (undated) DEVICE — LINER SURG CANSTR SXN S/RIGD 1500CC

## (undated) DEVICE — Device

## (undated) DEVICE — THE SINGLE USE ETRAP – POLYP TRAP IS USED FOR SUCTION RETRIEVAL OF ENDOSCOPICALLY REMOVED POLYPS.: Brand: ETRAP

## (undated) DEVICE — GOWN,REINFRCE,POLY,SIRUS,BREATH SLV,XXLG: Brand: MEDLINE

## (undated) DEVICE — Device: Brand: DEFENDO AIR/WATER/SUCTION AND BIOPSY VALVE

## (undated) DEVICE — STERILE POLYISOPRENE POWDER-FREE SURGICAL GLOVES: Brand: PROTEXIS